# Patient Record
Sex: FEMALE | Race: OTHER | NOT HISPANIC OR LATINO | URBAN - METROPOLITAN AREA
[De-identification: names, ages, dates, MRNs, and addresses within clinical notes are randomized per-mention and may not be internally consistent; named-entity substitution may affect disease eponyms.]

---

## 2022-12-28 ENCOUNTER — HOSPITAL ENCOUNTER (OUTPATIENT)
Dept: PREADMISSION TESTING | Facility: OTHER | Age: 72
Discharge: HOME | End: 2022-12-28
Payer: COMMERCIAL

## 2023-02-03 DIAGNOSIS — I89.0 LYMPHEDEMA: Primary | ICD-10-CM

## 2023-02-28 ENCOUNTER — CLINICAL SUPPORT (OUTPATIENT)
Dept: REHABILITATION | Facility: HOSPITAL | Age: 73
End: 2023-02-28
Payer: MEDICARE

## 2023-02-28 DIAGNOSIS — I89.0 LYMPHEDEMA OF RIGHT ARM: ICD-10-CM

## 2023-02-28 PROCEDURE — 97530 THERAPEUTIC ACTIVITIES: CPT

## 2023-02-28 PROCEDURE — 97166 OT EVAL MOD COMPLEX 45 MIN: CPT

## 2023-02-28 NOTE — PLAN OF CARE
[facility]  Occupational Therapy Initial Evaluation    Date: 2/28/2023  Name: Tiara Rasheed  Clinic Number: [id number]    Therapy Diagnosis:   Encounter Diagnosis   Name Primary?    Lymphedema of right arm      Physician: Shiloh Tao MD    Physician Orders: OT Eval and Treat  Medical Diagnosis: Lymphedema of RUE s/p breast cancer  Evaluation Date: 2/28/2023  Insurance Authorization Period Expiration: 12/31/2023  Plan of Care Certification Period: 2/28/2023 - 4/24/2023  Date of Return to MD: unknown  Visit # / Visits authorized: 1 / 1  FOTO: see media    Precautions:  Standard and cancer    Time In:3:00  Time Out: 4:00  Total Appointment Time (timed & untimed codes): 60 minutes    SUBJECTIVE     Date of Onset: over 15 years ago  Prior Therapy: several sessions of therapy over the years for RUE    History of Current Condition/Mechanism of Injury: Tiara Rasheed is a 72 y.o. female who presents to [facility] Outpatient Occupational Therapy for evaluation secondary to lymphedema. Patient was referred to therapy by Shiloh Tao MD , which is the patient's oncologist. Patient reports R Breast cancer about 15 years ago.  Patient underwent R mastectomy, axillary node dissection, chemotherapy and radiation. Patient reports lymphedema in RUE began about 3 years later. Patient has had therapy off and on for 10+years including manual lymphatic drainage and short stretch compression bandaging but has not had any formal treatment since covid 2020.  Patient has had history of cellulitis In the RUE.  She reports calling her MD on 2/23/2023, sending a photo, and being put on antibiotic for the pink color and increased swelling in the RUE and was put on antibiotocs that began 2/23/23 and will continue for 5 more days.   Patient has neuropathy  and spinal stenosis and a spur in the L shoulder causing neck pain   Patient was accompanied to the evaluation by Leif, her .      Falls: at risk, none recent    Occupation/Working presently: retired  Duties: homemaker    Functional Limitations/Social History:    Previous functional status includes: Independent with all ADLs.     Current Functional Status   Home/Living environment: lives with their spouse      Limitation of Functional Status as follows:   ADLs/IADLs:     - Feeding: independent    - Bathing: independent    - Dressing/Grooming: independent    - Driving: able but chooses to let her spouse drive her at this time.     Leisure: nor performing due to pain    Pain:  Functional Pain Scale Rating 0-10: Current 7/10, worst 8/10, best 7/10   Location: R arm/back/legs  Description: heavyheavy  Aggravating Factors: all the time  Easing Factors:  unable to tolerate walking and unable to take Advil which was helping    Patient's Goals for Therapy: to get the R arm back under control    Pt denies , KF, DVT, CA, infection, severe PAD  Recent Ultrasound test conformed no blood clots in RUE.        Medical History:   No past medical history on file. Patient reports Breast Cancer, Neuropathy, Spinal Stenosis, Congestive Heart Failure, spur in L shoulder    Surgical History:    has no past surgical history on file. .    Medications:   currently has no medications in their medication list.    Allergies:   Review of patient's allergies indicates:  Not on File       OBJECTIVE     Patient arrived and ambulates to the back of the clinic with some difficulty    Affected Areas: RUE  Refill: Day   Shape: very large          Tissue Texture: soft, pliable, pitting and spongy  Skin Integrity: intact, pink in color, reportedly increased last week. See photo above  Sensation: intact    UE Girth Measurements:  LANDMARK RIGHT UE  2/28/23 LEFT UE  2/28/23 DIFF   at eval         E + 15cm 36.0 cm 34.5 cm -1.5 cm   E + 10cm 39.0 cm 34.5 cm -4.5 cm   Elbow 35.5 cm 29.5 cm -6.0 cm         W + 15cm 36.0 cm 26.0 cm -10.0 cm   W + 10cm 31.0 cm 22.5 cm -9.5 cm    Wrist 20.0 cm 17.0 cm -3.0 cm   DPC 19.5 cm 18.0 cm -1.5 cm   IP 6.0 cm 5.5 cm -.5cm cm       Treatment   Total Treatment time (time-based codes) separate from Evaluation: 12 minutes    Tiara received the treatments listed below:     Pt was educated in potential compression needs.  Discussed cost/coverage and authorization per insurance with Durable Medical Equipment(DME) provider.  Compression require orders from referring provider and coverage or purchase of products from DME or self order.      Discussed wear schedule, don/doff, wash and management of products.  Size and compression class and AM/PM needs.    Product information provided.   Vendor list provided.    Informed insurance coverage of compression is per DME provider and typically Medicare and Medicare group plans may not cover cost beyond pair of standard sized knee high garments.   Commitment to attendance as well as commitment to securing compression needs is critical to edema management.        Patient Education and Home Exercises      Education provided:   1. Educated on definition of lymphedema.  2. Explained the Complete Decongestive Therapy protocol in depth  3. Educated on Phase 1 and 2 of protocol.  4. Reviewed treatment frequency and likely duration of weeks  5.Contraindications for treatment.  6. Plan of care and goals.  7. Educated on home management protocols.     Home Exercises and Patient Education Provided  Education provided:   - Pt was educated in lymphedema etiology and management plans.  Pt was provided with written risk reductions and precautions for managing lymphedema.     Patient/Family Education: role of OT, goals for OT, scheduling/cancellations - pt verbalized understanding. Discussed insurance limitations with patient.      ASSESSMENT     Tiara Rasheed is a 72 y.o. female referred to outpatient occupational therapy and presents with a medical diagnosis of lymphedema secondary to breast cancer. Lymphedema, left  untreated increases risk of infection, and poor body image. Patient presents with the following therapy deficits: RUE lymphedema and demonstrates limitations as described in the chart below. Following medical record review it is determined that pt will benefit from complete decongestive therapy services for the treatment and management of this chronic condition. The following goals were discussed with the patient and patient is in agreement with them as to be addressed in the treatment plan. The patient's rehab potential is Good.     Anticipated barriers to occupational therapy: recurrent cellulitis  Pt has no cultural, educational or language barriers to learning provided.    Profile and History Assessment of Occupational Performance Level of Clinical Decision Making Complexity Score   Occupational Profile:   Tiara Rasheed is a 72 y.o. female who lives with their spouse and is retired Tiara Rasheed has difficulty with  ADLs and IADLs as listed previously, which  Affecting herdaily functional abilities.      Comorbidities:    has no past medical history on file.    Medical and Therapy History Review:   Expanded               Performance Deficits    Physical:  Muscle Power/Strength  Muscle Endurance  Edema  Pain    Cognitive:  No Deficits    Psychosocial:    No Deficits     Clinical Decision Making:  moderate    Assessment Process:  Detailed Assessments    Modification/Need for Assistance:  Minimal-Moderate Modifications/Assistance    Intervention Selection:  Several Treatment Options       moderate  Based on PMHX, co morbidities , data from assessments and functional level of assistance required with task and clinical presentation directly impacting function.       The following goals were discussed with the patient and patient is in agreement with them as to be addressed in the treatment plan.     Goals:   Short Term Goals for 3 weeks: (phase 1 of protocol)  1. Patient and/or caregiver will demonstrate  understanding of lymphedema precautions to decrease the risk of infection and lymphedema exacerbation. - Ongoing 2/28/2023   2. Patient will tolerate daily activities wearing multilayered bandaging.- Ongoing 2/28/2023   3. Patient and/or caregiver will order/obtain appropriate compression garments- Ongoing 2/28/2023  4. Patient will experience a decrease in girth of affected areas of 1.0 cm- Ongoing 2/28/2023      Long Term Goals for 6 weeks: (Phase 2 of goals)  1. Patient and/or caregiver will be independent with donning and doffing of compression garments.  2. Patient and/or caregiver will be independent in self-bandaging and self MLD techniques.  3. Patient and/or caregiver will be independent with HEP and lymphedema management to help prevent edema relapse and reduce risk of infection.  4. Patient will experience a decreased in girth of affected areas of 2.0 cm      PLAN   Plan of Care Certification: 2/28/2023 to 4/24/2023.     Outpatient Occupational Therapy 2 times weekly for 8 weeks to include the following interventions: Edema Control.      SOFIA Jacques      I CERTIFY THE NEED FOR THESE SERVICES FURNISHED UNDER THIS PLAN OF TREATMENT AND WHILE UNDER MY CARE  Physician's comments:      Physician's Signature: ___________________________________________________

## 2023-03-07 ENCOUNTER — CLINICAL SUPPORT (OUTPATIENT)
Dept: REHABILITATION | Facility: HOSPITAL | Age: 73
End: 2023-03-07
Payer: MEDICARE

## 2023-03-07 DIAGNOSIS — I89.0 LYMPHEDEMA OF RIGHT ARM: Primary | ICD-10-CM

## 2023-03-07 PROCEDURE — 97140 MANUAL THERAPY 1/> REGIONS: CPT

## 2023-03-07 NOTE — PROGRESS NOTES
[facility]  Occupational Therapy Treatment Note    Date: 3/7/2023  Name: Tiara Rasheed  Clinic Number: [id number]    Time In: 11:00  Time Out: 12:00  Total Billable Time: 60 minutes    Therapy Diagnosis:   Encounter Diagnosis   Name Primary?    Lymphedema of right arm Yes     Physician: Shiloh Tao MD  Physician Orders: OT Eval and Treat    Evaluation Date: 2/28/2023  Insurance Authorization Period Expiration: 2/28/2024  Plan of Care Expiration: 4/24/2023  Visit # / Visits authorized: 1 / 20    Precautions:  Standard and cancer    SUBJECTIVE     Pt reports: She has contacted her MD office many times about the remaining pinkness in the RUE, with no return phone call. Patient has now completed 10 days of antibiotics.     Response to previous treatment:first follow up visit today  Functional change: n/a    Pain: 4/10  Location: right arm    OBJECTIVE     Pt arrived wearing nothing on the arm. The arm is pink but not warm, see photo.            Objective Measures updated at progress report unless specified.    Treatment     Tiara received the treatments listed below:     Manual therapy techniques were applied for 60 minutes, including:    MANUAL LYMPHATIC DRAINAGE (MLD):    While supine with LEs elevated  over wedge, stimulation performed about the lymph nodes of the head and neck.  The entire R upper quadrant received drainage. The anterior chest wall was shunted across towards the L axilla. The  arm is full of thick pitting edema. There is pinkness but not warmth to the skin, see photos. The upper arm was treated with scooping and pumping and deep thumb [place] techniques. The cubital fossa was cleared. The forearm was treated with deep thumb [place] techniques to break down the pitting edema, as was the dorsal hand. The digits were cleared then the entire arm was shunted up through the posterior upper arm. Patient turned to sidelying and fluid was shunted across the back towards  the L axilla.     MULTILAYERED BANDAGING:  issued supplies and bandaged R UE with cotton stockinette, 1-6cm, 1-8cm and - 10cm Ros[place] K rolls dorsal hand to axilla, to leave intact 12-24 hrs as tolerated, discontinue with any problems, return rolled bandages next session. Wash and wear schedules confirmed.     Pt was educated in potential compression needs.       Discussed wear schedule, don/doff, wash and management of products.    Patient Education and Home Exercises      Education provided:   1. Educated on cause of lymphedema.  2. Explained the Complete Decongestive Therapy protocol in depth  3. Educated on Phase 1 and 2 of protocol.  4. Reviewed treatment frequency and likely duration of weeks  5. Precautions and contraindications for treatment.  6. Plan of care and goals.  7. Educated on home management protocols.     Written Home Compression program Provided: Patient to have the arm in short stretch compression bandages or compression sleeve daily.  E       Assessment        Tiara Rasheed is a 72 y.o. female referred to outpatient occupational therapy and presents with a medical diagnosis of lymphedema secondary to breast cancer. Lymphedema, left untreated increases risk of infection, and poor body image. Patient presents with the following therapy deficits: RUE lymphedema .Following medical record review it is determined that pt will benefit from complete decongestive therapy services for the treatment and management of this chronic condition. The following goals were discussed with the patient and patient is in agreement with them as to be addressed in the treatment plan. The patient's rehab potential is Good.   Pt would continue to benefit from skilled OT. yes     Tiara is progressing well towards her goals and there are no updates to goals at this time. Pt prognosis is Good.     Pt will continue to benefit from skilled outpatient occupational therapy to address the deficits listed in the problem  list on initial evaluation provide pt/family education and to maximize pt's level of independence in the home and community environment.     Pt's spiritual, cultural and educational needs considered and pt agreeable to plan of care and goals.    Anticipated barriers to occupational therapy: duration of lymphedema and size of the arm    Goals:   Short Term Goals for 3 weeks: (phase 1 of protocol)  1. Patient and/or caregiver will demonstrate understanding of lymphedema precautions to decrease the risk of infection and lymphedema exacerbation. - Ongoing 3/7/2023   2. Patient will tolerate daily activities wearing multilayered bandaging.- Ongoing 3/7/2023   3. Patient and/or caregiver will order/obtain appropriate compression garments- Ongoing [id number]  4. Patient will experience a decrease in girth of affected areas of 1.0 cm- Ongoing3/7/2023      Long Term Goals for 6 weeks: (Phase 2 of goals)  1. Patient and/or caregiver will be independent with donning and doffing of compression garments.  2. Patient and/or caregiver will be independent in self-bandaging and self MLD techniques.  3. Patient and/or caregiver will be independent with HEP and lymphedema management to help prevent edema relapse and reduce risk of infection.  4. Patient will experience a decreased in girth of affected areas of 2.0 cm     PLAN     Continue plan of care 2 times weekly for 8 weeks to include the following interventions: Edema Control.    Ute Yeung OT, SOFIA

## 2023-03-14 ENCOUNTER — CLINICAL SUPPORT (OUTPATIENT)
Dept: REHABILITATION | Facility: HOSPITAL | Age: 73
End: 2023-03-14
Payer: MEDICARE

## 2023-03-14 DIAGNOSIS — I89.0 LYMPHEDEMA OF RIGHT ARM: Primary | ICD-10-CM

## 2023-03-14 PROCEDURE — 97140 MANUAL THERAPY 1/> REGIONS: CPT

## 2023-03-14 NOTE — PROGRESS NOTES
[facility]  Occupational Therapy Treatment Note    Date: 3/14/2023  Name: Tiara Rasheed  Clinic Number: [id number]    Time In: 11:00  Time Out: 12:00  Total Billable Time: 60 minutes    Therapy Diagnosis:   Encounter Diagnosis   Name Primary?    Lymphedema of right arm Yes     Physician: Shiloh Tao MD  Physician Orders: OT Eval and Treat    Evaluation Date: 2/28/2023  Insurance Authorization Period Expiration: 2/28/2024  Plan of Care Expiration: 4/24/2023  Visit # / Visits authorized: 2 / 20    Precautions:  Standard and cancer    SUBJECTIVE     Pt reports: She contacted her MD office  again about the remaining pinkness in the RUE, with no return phone call. Patient has now completed 10 days of antibiotics.     Response to previous treatment: the RUE is softer throughout. Following MLD the pinkness in the arm is lighter  Functional change: n/a    Pain: 4/10  Location: right arm    OBJECTIVE     Pt arrived wearing nothing on the arm. The arm remains pink but not warm.    Objective Measures updated at progress report unless specified.    Treatment     Tiara received the treatments listed below:     Manual therapy techniques were applied for 60 minutes, including:    MANUAL LYMPHATIC DRAINAGE (MLD):    While supine with LEs elevated  over wedge, stimulation performed about the lymph nodes of the head and neck.  The entire R upper quadrant received drainage. The anterior chest wall was shunted across towards the L axilla. The  arm is full of thick pitting edema. There is pinkness but not warmth to the skin. The upper arm was treated with scooping and pumping and deep thumb [place] techniques. The cubital fossa was cleared. The forearm was treated with deep thumb [place] techniques to break down the pitting edema, as was the dorsal hand. The digits were cleared then the entire arm was shunted up through the posterior upper arm. Patient turned to sidelying and fluid was shunted across  the back towards the L axilla.     MULTILAYERED BANDAGING: bandaged R UE with cotton stockinette, 1-6cm, 1-8cm and - 10cm [place] K rolls dorsal hand to axilla, to leave intact 12-24 hrs as tolerated, discontinue with any problems, return rolled bandages next session. Wash and wear schedules confirmed.     Pt was educated in potential compression needs.       Discussed wear schedule, don/doff, wash and management of products.    Patient Education and Home Exercises      Education provided:   1. Educated on cause of lymphedema.  2. Explained the Complete Decongestive Therapy protocol in depth  3. Educated on Phase 1 and 2 of protocol.  4. Reviewed treatment frequency and likely duration of weeks  5. Precautions and contraindications for treatment.  6. Plan of care and goals.  7. Educated on home management protocols.     Written Home Compression program Provided: Patient to have the arm in short stretch compression bandages or compression sleeve daily.  E       Assessment        Tiara Rasheed is a 72 y.o. female referred to outpatient occupational therapy and presents with a medical diagnosis of lymphedema secondary to breast cancer. Lymphedema, left untreated increases risk of infection, and poor body image. Patient presents with the following therapy deficits: RUE lymphedema .Following medical record review it is determined that pt will benefit from complete decongestive therapy services for the treatment and management of this chronic condition. The following goals were discussed with the patient and patient is in agreement with them as to be addressed in the treatment plan. The patient's rehab potential is Good.   Pt would continue to benefit from skilled OT. yes     Tiara is progressing well towards her goals and there are no updates to goals at this time. Pt prognosis is Good.     Pt will continue to benefit from skilled outpatient occupational therapy to address the deficits listed in the problem list on  initial evaluation provide pt/family education and to maximize pt's level of independence in the home and community environment.     Pt's spiritual, cultural and educational needs considered and pt agreeable to plan of care and goals.    Anticipated barriers to occupational therapy: duration of lymphedema and size of the arm    Goals:   Short Term Goals for 3 weeks: (phase 1 of protocol)  1. Patient and/or caregiver will demonstrate understanding of lymphedema precautions to decrease the risk of infection and lymphedema exacerbation. - Ongoing 3/14/2023   2. Patient will tolerate daily activities wearing multilayered bandaging.- Ongoing 3/14/2023   3. Patient and/or caregiver will order/obtain appropriate compression garments- Ongoing 3/14/2023  4. Patient will experience a decrease in girth of affected areas of 1.0 cm- Ongoing3/14/2023      Long Term Goals for 6 weeks: (Phase 2 of goals)  1. Patient and/or caregiver will be independent with donning and doffing of compression garments.  2. Patient and/or caregiver will be independent in self-bandaging and self MLD techniques.  3. Patient and/or caregiver will be independent with HEP and lymphedema management to help prevent edema relapse and reduce risk of infection.  4. Patient will experience a decreased in girth of affected areas of 2.0 cm     PLAN     Continue plan of care 2 times weekly for 8 weeks to include the following interventions: Edema Control.    Ute Yeung OT, SOFIA

## 2023-03-16 ENCOUNTER — CLINICAL SUPPORT (OUTPATIENT)
Dept: REHABILITATION | Facility: HOSPITAL | Age: 73
End: 2023-03-16
Payer: MEDICARE

## 2023-03-16 DIAGNOSIS — I89.0 LYMPHEDEMA OF RIGHT ARM: Primary | ICD-10-CM

## 2023-03-16 PROCEDURE — 97140 MANUAL THERAPY 1/> REGIONS: CPT

## 2023-03-16 NOTE — PROGRESS NOTES
[facility]  Occupational Therapy Treatment Note    Date: 3/16/2023  Name: Tiara Rasheed  Clinic Number: [id number]    Time In: 1:00  Time Out: 2:00  Total Billable Time: 60 minutes    Therapy Diagnosis:   Encounter Diagnosis   Name Primary?    Lymphedema of right arm Yes     Physician: Shiloh Tao MD  Physician Orders: OT Eval and Treat    Evaluation Date: 2/28/2023  Insurance Authorization Period Expiration: 2/28/2024  Plan of Care Expiration: 4/24/2023  Visit # / Visits authorized:3/ 20    Precautions:  Standard and cancer    SUBJECTIVE     Pt reports: The RUE is less pink over the last 2 days. Patient states she is wearing the bandages or the compression sleeve daily.     Response to previous treatment: the RUE is softer throughout. Following MLD the pinkness in the arm is lighter  Functional change: n/a    Pain: 8/10  Location: the back    OBJECTIVE     Pt arrived wearing nothing on the arm. The arm remains pink but not warm.    Objective Measures updated at progress report unless specified.    Treatment     Tiara received the treatments listed below:     Manual therapy techniques were applied for 60 minutes, including:    MANUAL LYMPHATIC DRAINAGE (MLD):    While supine with LEs elevated  over wedge, stimulation performed about the lymph nodes of the head and neck.  The entire R upper quadrant received drainage. The anterior chest wall was shunted across towards the L axilla. The  arm is full of thick pitting edema. There is decreased pinkness but not warmth to the skin. The upper arm was treated with scooping and pumping and deep thumb [place] techniques. The cubital fossa was cleared. The forearm was treated with deep thumb [place] techniques to break down the pitting edema, as was the dorsal hand. The digits were cleared then the entire arm was shunted up through the posterior upper arm. Patient turned to sidelying and fluid was shunted across the back towards the L  axilla.     MULTILAYERED BANDAGING: bandaged R UE with cotton stockinette, 1-6cm, 1-8cm and - 10cm Rosidal K rolls dorsal hand to axilla, to leave intact 12-24 hrs as tolerated, discontinue with any problems, return rolled bandages next session. Wash and wear schedules confirmed.     Pt was educated in potential compression needs.       Discussed wear schedule, don/doff, wash and management of products.    Patient Education and Home Exercises      Education provided:   1. Educated on cause of lymphedema.  2. Explained the Complete Decongestive Therapy protocol in depth  3. Educated on Phase 1 and 2 of protocol.  4. Reviewed treatment frequency and likely duration of weeks  5. Precautions and contraindications for treatment.  6. Plan of care and goals.  7. Educated on home management protocols.     Written Home Compression program Provided: Patient to have the arm in short stretch compression bandages or compression sleeve daily.  E       Assessment        Tiara Rasheed is a 72 y.o. female referred to outpatient occupational therapy and presents with a medical diagnosis of lymphedema secondary to breast cancer. Lymphedema, left untreated increases risk of infection, and poor body image. Patient presents with the following therapy deficits: RUE lymphedema .Following medical record review it is determined that pt will benefit from complete decongestive therapy services for the treatment and management of this chronic condition. The following goals were discussed with the patient and patient is in agreement with them as to be addressed in the treatment plan. The patient's rehab potential is Good.   Pt would continue to benefit from skilled OT. yes     Tiara is progressing well towards her goals and there are no updates to goals at this time. Pt prognosis is Good.     Pt will continue to benefit from skilled outpatient occupational therapy to address the deficits listed in the problem list on initial evaluation  provide pt/family education and to maximize pt's level of independence in the home and community environment.     Pt's spiritual, cultural and educational needs considered and pt agreeable to plan of care and goals.    Anticipated barriers to occupational therapy: duration of lymphedema and size of the arm    Goals:   Short Term Goals for 3 weeks: (phase 1 of protocol)  1. Patient and/or caregiver will demonstrate understanding of lymphedema precautions to decrease the risk of infection and lymphedema exacerbation. - Ongoing 3/16/2023   2. Patient will tolerate daily activities wearing multilayered bandaging.- Ongoing 3/16/2023   3. Patient and/or caregiver will order/obtain appropriate compression garments- Ongoing [id number]  4. Patient will experience a decrease in girth of affected areas of 1.0 cm- Ongoing3/16/2023      Long Term Goals for 6 weeks: (Phase 2 of goals)  1. Patient and/or caregiver will be independent with donning and doffing of compression garments.  2. Patient and/or caregiver will be independent in self-bandaging and self MLD techniques.  3. Patient and/or caregiver will be independent with HEP and lymphedema management to help prevent edema relapse and reduce risk of infection.  4. Patient will experience a decreased in girth of affected areas of 2.0 cm     PLAN     Continue plan of care 2 times weekly for 8 weeks to include the following interventions: Edema Control.    Ute Yeung OT, SOFIA

## 2023-03-23 ENCOUNTER — CLINICAL SUPPORT (OUTPATIENT)
Dept: REHABILITATION | Facility: HOSPITAL | Age: 73
End: 2023-03-23
Payer: COMMERCIAL

## 2023-03-23 DIAGNOSIS — I89.0 LYMPHEDEMA OF RIGHT ARM: Primary | ICD-10-CM

## 2023-03-23 PROCEDURE — 97140 MANUAL THERAPY 1/> REGIONS: CPT

## 2023-03-23 NOTE — PROGRESS NOTES
[facility]  Occupational Therapy Treatment Note    Date: 3/23/2023  Name: Tiara Rasheed  Clinic Number: [id number]    Time In: 11:00  Time Out: 12:00  Total Billable Time: 60 minutes    Therapy Diagnosis:   Encounter Diagnosis   Name Primary?    Lymphedema of right arm Yes     Physician: Shiloh Tao MD  Physician Orders: OT Eval and Treat    Evaluation Date: 2/28/2023  Insurance Authorization Period Expiration: 2/28/2024  Plan of Care Expiration: 4/24/2023  Visit # / Visits authorized:4/ 20    Precautions:  Standard and cancer    SUBJECTIVE     Pt reports: The RUE continues to be pink over the last week. Patient is concerned about the color and is unable to have oral procedures until the pink resolves. Patient is using the compression pump at home daily.     Response to previous treatment: the RUE is softer throughout. Immediately following MLD the pinkness in the arm is lighter  Functional change: n/a    Pain: 8/10  Location: the back, neck, legs    OBJECTIVE     Pt arrived wearing nothing on the arm. The arm remains pink but not warm.            UE Girth Measurements:  LANDMARK RIGHT UE  2/28/23 Right UE  3/23/23 DIFF               E + 15cm 36.0 cm 35.0 cm -1.0cm   E + 10cm 39.0 cm 36.5 cm -2.5 cm   Elbow 35.5 cm 34.0 cm -1.5cm             W + 15cm 36.0 cm 34.0 cm -2.0 cm   W + 10cm 31.0 cm 31.0 cm 0 cm   Wrist 20.0 cm 19.5 cm -.5cm   DPC 19.5 cm 19.5cm 0 cm   IP 6.0 cm 6.0 cm 0 cm      Objective Measures updated at progress report unless specified.    Treatment     Tiara received the treatments listed below:     Manual therapy techniques were applied for 60 minutes, including:    MANUAL LYMPHATIC DRAINAGE (MLD):    While supine with LEs elevated  over wedge, stimulation performed about the lymph nodes of the head and neck.  The entire R upper quadrant received drainage. The anterior chest wall was shunted across towards the L axilla. The  arm is full of thick pitting edema.  There is persistent  pinkness but not warmth to the skin. The upper arm was treated with scooping and pumping and deep thumb [place] techniques. The cubital fossa was cleared. The forearm was treated with deep thumb [place] techniques to break down the pitting edema, as was the dorsal hand. The digits were cleared then the entire arm was shunted up through the posterior upper arm. Patient turned to sidelying and fluid was shunted across the back towards the L axilla.     MULTILAYERED BANDAGING: bandaged R UE with cotton stockinette, 1-6cm, 1-8cm and - 10cm Ros[place] K rolls dorsal hand to axilla, to leave intact 12-24 hrs as tolerated, discontinue with any problems, return rolled bandages next session. Wash and wear schedules confirmed.     Patient was encouraged to continue using her home sequential compression pump daily    Pt was educated in potential compression needs.       Discussed wear schedule, don/doff, wash and management of products.    Patient Education and Home Exercises      Education provided:   1. Educated on cause of lymphedema.  2. Explained the Complete Decongestive Therapy protocol in depth  3. Educated on Phase 1 and 2 of protocol.  4. Reviewed treatment frequency and likely duration of weeks  5. Precautions and contraindications for treatment.  6. Plan of care and goals.  7. Educated on home management protocols.     Written Home Compression program Provided: Patient to have the arm in short stretch compression bandages or compression sleeve daily.         Assessment        Tiara Rasheed is a 72 y.o. female referred to outpatient occupational therapy and presents with a medical diagnosis of lymphedema secondary to breast cancer. Lymphedema, left untreated increases risk of infection, and poor body image. Patient presents with the following therapy deficits: RUE lymphedema .Following medical record review it is determined that pt will benefit from complete decongestive therapy services for the  treatment and management of this chronic condition. The following goals were discussed with the patient and patient is in agreement with them as to be addressed in the treatment plan. The patient's rehab potential is Good.   Pt would continue to benefit from skilled OT. yes     Tiara is progressing well towards her goals and there are no updates to goals at this time. Pt prognosis is Good.     Pt will continue to benefit from skilled outpatient occupational therapy to address the deficits listed in the problem list on initial evaluation provide pt/family education and to maximize pt's level of independence in the home and community environment.     Pt's spiritual, cultural and educational needs considered and pt agreeable to plan of care and goals.    Anticipated barriers to occupational therapy: duration of lymphedema and size of the arm    Goals:   Short Term Goals for 3 weeks: (phase 1 of protocol)  1. Patient and/or caregiver will demonstrate understanding of lymphedema precautions to decrease the risk of infection and lymphedema exacerbation. - Ongoing 3/23/2023   2. Patient will tolerate daily activities wearing multilayered bandaging.- Ongoing [id number]   3. Patient and/or caregiver will order/obtain appropriate compression garments- Ongoing 3/23/2023  4. Patient will experience a decrease in girth of affected areas of 1.0 cm- Partially met 3/23/2023      Long Term Goals for 6 weeks: (Phase 2 of goals)  1. Patient and/or caregiver will be independent with donning and doffing of compression garments.  2. Patient and/or caregiver will be independent in self-bandaging and self MLD techniques.  3. Patient and/or caregiver will be independent with HEP and lymphedema management to help prevent edema relapse and reduce risk of infection.  4. Patient will experience a decreased in girth of affected areas of 2.0 cm     PLAN     Continue plan of care 2 times weekly for 8 weeks to include the following  interventions: Edema Control.    Ute Yeung OT, SOFIA

## 2023-03-30 ENCOUNTER — CLINICAL SUPPORT (OUTPATIENT)
Dept: REHABILITATION | Facility: HOSPITAL | Age: 73
End: 2023-03-30
Payer: MEDICARE

## 2023-03-30 DIAGNOSIS — I89.0 LYMPHEDEMA OF RIGHT ARM: Primary | ICD-10-CM

## 2023-03-30 PROCEDURE — 97140 MANUAL THERAPY 1/> REGIONS: CPT

## 2023-03-30 NOTE — PROGRESS NOTES
[facility]  Occupational Therapy Treatment Note    Date: 3/30/2023  Name: Tiara Rasheed  Clinic Number: [id number]    Time In: 1:00  Time Out: 2:00  Total Billable Time: 60 minutes    Therapy Diagnosis:   Encounter Diagnosis   Name Primary?    Lymphedema of right arm Yes     Physician: Shiloh Tao MD  Physician Orders: OT Eval and Treat    Evaluation Date: 2/28/2023  Insurance Authorization Period Expiration: 2/28/2024  Plan of Care Expiration: 4/24/2023  Visit # / Visits authorized:5/ 20    Precautions:  Standard and cancer    SUBJECTIVE     Pt reports: The RUE continues to be pink over the last week. Patient did have a call from her MD's nurse but was unable to return the call.  Patient is using the compression pump at home daily.     Response to previous treatment: the RUE is softer throughout. Immediately following MLD the pinkness in the arm is lighter  Functional change: n/a    Pain: 8/10  Location: the back, neck, legs    OBJECTIVE     Pt arrived wearing nothing on the arm. The arm remains pink but not warm.          Objective Measures updated at progress report unless specified.    Treatment     Tiara received the treatments listed below:     Manual therapy techniques were applied for 60 minutes, including:    MANUAL LYMPHATIC DRAINAGE (MLD):    While supine with LEs elevated  over wedge, stimulation performed about the lymph nodes of the head and neck.  The entire R upper quadrant received drainage. The anterior chest wall was shunted across towards the L axilla. The  arm is full of thick pitting edema. There is persistent  pinkness but not warmth to the skin. The upper arm was treated with scooping and pumping and deep thumb [place] techniques. The cubital fossa was cleared. The forearm was treated with deep thumb [place] techniques to break down the pitting edema, as was the dorsal hand. The digits were cleared then the entire arm was shunted up through the posterior  upper arm. Patient turned to sidelying and fluid was shunted across the back towards the L axilla.     MULTILAYERED BANDAGING: bandaged R UE with cotton stockinette, 1-6cm, 1-8cm and - 10cm Ros[place] K rolls dorsal hand to axilla, to leave intact 12-24 hrs as tolerated, discontinue with any problems, return rolled bandages next session. Wash and wear schedules confirmed.     Patient was encouraged to continue using her home sequential compression pump daily    Pt was educated in potential compression needs.       Discussed wear schedule, don/doff, wash and management of products.    Patient Education and Home Exercises      Education provided:   1. Educated on cause of lymphedema.  2. Explained the Complete Decongestive Therapy protocol in depth  3. Educated on Phase 1 and 2 of protocol.  4. Reviewed treatment frequency and likely duration of weeks  5. Precautions and contraindications for treatment.  6. Plan of care and goals.  7. Educated on home management protocols.     Written Home Compression program Provided: Patient to have the arm in short stretch compression bandages or compression sleeve daily.         Assessment        Tiara Rasheed is a 72 y.o. female referred to outpatient occupational therapy and presents with a medical diagnosis of lymphedema secondary to breast cancer. Lymphedema, left untreated increases risk of infection, and poor body image. Patient presents with the following therapy deficits: RUE lymphedema .Following medical record review it is determined that pt will benefit from complete decongestive therapy services for the treatment and management of this chronic condition. The following goals were discussed with the patient and patient is in agreement with them as to be addressed in the treatment plan. The patient's rehab potential is Good.   Pt would continue to benefit from skilled OT. yes     Tiara is progressing well towards her goals and there are no updates to goals at this  time. Pt prognosis is Good.     Pt will continue to benefit from skilled outpatient occupational therapy to address the deficits listed in the problem list on initial evaluation provide pt/family education and to maximize pt's level of independence in the home and community environment.     Pt's spiritual, cultural and educational needs considered and pt agreeable to plan of care and goals.    Anticipated barriers to occupational therapy: duration of lymphedema and size of the arm    Goals:   Short Term Goals for 3 weeks: (phase 1 of protocol)  1. Patient and/or caregiver will demonstrate understanding of lymphedema precautions to decrease the risk of infection and lymphedema exacerbation. - Ongoing 3/30/2023 0/  2. Patient will tolerate daily activities wearing multilayered bandaging.- Ongoing 3/30/2023   3. Patient and/or caregiver will order/obtain appropriate compression garments- Ongoing3/30/2023  4. Patient will experience a decrease in girth of affected areas of 1.0 cm- Partially met 3/23/2023      Long Term Goals for 6 weeks: (Phase 2 of goals)  1. Patient and/or caregiver will be independent with donning and doffing of compression garments.  2. Patient and/or caregiver will be independent in self-bandaging and self MLD techniques.  3. Patient and/or caregiver will be independent with HEP and lymphedema management to help prevent edema relapse and reduce risk of infection.  4. Patient will experience a decreased in girth of affected areas of 2.0 cm     PLAN     Continue plan of care 2 times weekly for 8 weeks to include the following interventions: Edema Control.    Ute Yeung OT, SOFIA

## 2023-04-04 ENCOUNTER — CLINICAL SUPPORT (OUTPATIENT)
Dept: REHABILITATION | Facility: HOSPITAL | Age: 73
End: 2023-04-04
Payer: COMMERCIAL

## 2023-04-04 DIAGNOSIS — I89.0 LYMPHEDEMA OF RIGHT ARM: Primary | ICD-10-CM

## 2023-04-04 PROCEDURE — 97140 MANUAL THERAPY 1/> REGIONS: CPT

## 2023-04-04 NOTE — PROGRESS NOTES
[facility]  Occupational Therapy Treatment Note    Date: 4/4/2023  Name: Tiara Rasheed  Clinic Number: [id number]    Time In: 11:00  Time Out: 12:00  Total Billable Time: 60 minutes    Therapy Diagnosis:   Encounter Diagnosis   Name Primary?    Lymphedema of right arm Yes     Physician: Shiloh Tao MD  Physician Orders: OT Eval and Treat    Evaluation Date: 2/28/2023  Insurance Authorization Period Expiration: 2/28/2024  Plan of Care Expiration: 4/24/2023  Visit # / Visits authorized:6/ 20    Precautions:  Standard and cancer    SUBJECTIVE     Pt reports: The RUE continues to be pink over the last week.  Patient is using the compression pump at home daily, she used it this morning  Patient is having severe back pain.     Response to previous treatment: the RUE is softer throughout. Immediately following MLD the pinkness in the arm is lighter  Functional change: n/a    Pain: 8/10  Location: the back, neck, legs    OBJECTIVE     Pt arrived wearing nothing on the arm. The arm remains pink but not warm.            Objective Measures updated at progress report unless specified.    Treatment     Tiara received the treatments listed below:     Manual therapy techniques were applied for 60 minutes, including:    MANUAL LYMPHATIC DRAINAGE (MLD):    While supine with LEs elevated  over wedge, stimulation performed about the lymph nodes of the head and neck.  The entire R upper quadrant received drainage. The anterior chest wall was shunted across towards the L axilla. The  arm is full of thick pitting edema. There is persistent  pinkness but not warmth to the skin. The upper arm was treated with scooping and pumping and deep thumb [place] techniques. The cubital fossa was cleared. The forearm was treated with deep thumb [place] techniques to break down the pitting edema, as was the dorsal hand. The digits were cleared then the entire arm was shunted up through the posterior upper arm.  Patient turned to sidelying and fluid was shunted across the back towards the L axilla.     MULTILAYERED BANDAGING: bandaged R UE with cotton stockinette, 1-6cm, 1-8cm and - 10cm Ros[place] K rolls dorsal hand to axilla, to leave intact 12-24 hrs as tolerated, discontinue with any problems, return rolled bandages next session. Wash and wear schedules confirmed.     Patient was encouraged to continue using her home sequential compression pump daily    Pt was educated in potential compression needs.       Discussed wear schedule, don/doff, wash and management of products.    Patient Education and Home Exercises      Education provided:   1. Educated on cause of lymphedema.  2. Explained the Complete Decongestive Therapy protocol in depth  3. Educated on Phase 1 and 2 of protocol.  4. Reviewed treatment frequency and likely duration of weeks  5. Precautions and contraindications for treatment.  6. Plan of care and goals.  7. Educated on home management protocols.     Written Home Compression program Provided: Patient to have the arm in short stretch compression bandages or compression sleeve daily.         Assessment        Tiara Rasheed is a 72 y.o. female referred to outpatient occupational therapy and presents with a medical diagnosis of lymphedema secondary to breast cancer. Lymphedema, left untreated increases risk of infection, and poor body image. Patient presents with the following therapy deficits: RUE lymphedema .Following medical record review it is determined that pt will benefit from complete decongestive therapy services for the treatment and management of this chronic condition. The following goals were discussed with the patient and patient is in agreement with them as to be addressed in the treatment plan. The patient's rehab potential is Good.   Pt would continue to benefit from skilled OT. yes     Tiara is progressing well towards her goals and there are no updates to goals at this time. Pt  prognosis is Good.     Pt will continue to benefit from skilled outpatient occupational therapy to address the deficits listed in the problem list on initial evaluation provide pt/family education and to maximize pt's level of independence in the home and community environment.     Pt's spiritual, cultural and educational needs considered and pt agreeable to plan of care and goals.    Anticipated barriers to occupational therapy: duration of lymphedema and size of the arm    Goals:   Short Term Goals for 3 weeks: (phase 1 of protocol)  1. Patient and/or caregiver will demonstrate understanding of lymphedema precautions to decrease the risk of infection and lymphedema exacerbation. - Ongoing 4/4/2023   2. Patient will tolerate daily activities wearing multilayered bandaging.- Ongoing 4/4/2023   3. Patient and/or caregiver will order/obtain appropriate compression garments- Ongoing4/4/2023  4. Patient will experience a decrease in girth of affected areas of 1.0 cm- Partially met 3/23/2023      Long Term Goals for 6 weeks: (Phase 2 of goals)  1. Patient and/or caregiver will be independent with donning and doffing of compression garments.  2. Patient and/or caregiver will be independent in self-bandaging and self MLD techniques.  3. Patient and/or caregiver will be independent with HEP and lymphedema management to help prevent edema relapse and reduce risk of infection.  4. Patient will experience a decreased in girth of affected areas of 2.0 cm     PLAN     Continue plan of care 2 times weekly for 8 weeks to include the following interventions: Edema Control.    Ute Yeung OT, SOFIA

## 2023-04-13 ENCOUNTER — CLINICAL SUPPORT (OUTPATIENT)
Dept: REHABILITATION | Facility: HOSPITAL | Age: 73
End: 2023-04-13
Payer: MEDICARE

## 2023-04-13 DIAGNOSIS — I89.0 LYMPHEDEMA OF RIGHT ARM: Primary | ICD-10-CM

## 2023-04-13 PROCEDURE — 97140 MANUAL THERAPY 1/> REGIONS: CPT

## 2023-04-13 NOTE — PROGRESS NOTES
[facility]  Occupational Therapy Treatment Note    Date: 4/13/2023  Name: Tiara Rasheed  Clinic Number: [id number]    Time In: 1:00  Time Out: 2:00  Total Billable Time: 60 minutes    Therapy Diagnosis:   Encounter Diagnosis   Name Primary?    Lymphedema of right arm Yes     Physician: Shiloh Tao MD  Physician Orders: OT Eval and Treat    Evaluation Date: 2/28/2023  Insurance Authorization Period Expiration: 2/28/2024  Plan of Care Expiration: 4/24/2023  Visit # / Visits authorized:7/ 20    Precautions:  Standard and cancer    SUBJECTIVE     Pt reports: The RUE continues to be pink over the last week.  Patient is using the compression pump at home daily, she did not use it this morning  Patient is having severe back pain.     Response to previous treatment: the RUE is softer throughout. Immediately following MLD the pinkness in the arm is lighter  Functional change: n/a    Pain: 8/10  Location: the back, neck, legs    OBJECTIVE     Pt arrived wearing nothing on the arm. The arm appears slightly less pink and not warm.            Objective Measures updated at progress report unless specified.    Treatment     Tiara received the treatments listed below:     Manual therapy techniques were applied for 60 minutes, including:    MANUAL LYMPHATIC DRAINAGE (MLD):    While supine with LEs elevated  over wedge, stimulation performed about the lymph nodes of the head and neck.  The entire R upper quadrant received drainage. The anterior chest wall was shunted across towards the L axilla. The  arm is full of thick pitting edema. There is persistent  pinkness but not warmth to the skin. The upper arm was treated with scooping and pumping and deep thumb [place] techniques. The cubital fossa was cleared. The forearm was treated with deep thumb [place] techniques to break down the pitting edema, as was the dorsal hand. The digits were cleared then the entire arm was shunted up through the  posterior upper arm. Patient turned to sidelying and fluid was shunted across the back towards the L axilla.     MULTILAYERED BANDAGING: bandaged R UE with cotton stockinette, 1-6cm, 1-8cm and - 10cm Ros[place] K rolls dorsal hand to axilla, to leave intact 12-24 hrs as tolerated, discontinue with any problems, return rolled bandages next session. Wash and wear schedules confirmed.     Patient was encouraged to continue using her home sequential compression pump daily    Pt was educated in potential compression needs.       Discussed wear schedule, don/doff, wash and management of products.    Patient Education and Home Exercises      Education provided:   1. Educated on cause of lymphedema.  2. Explained the Complete Decongestive Therapy protocol in depth  3. Educated on Phase 1 and 2 of protocol.  4. Reviewed treatment frequency and likely duration of weeks  5. Precautions and contraindications for treatment.  6. Plan of care and goals.  7. Educated on home management protocols.     Written Home Compression program Provided: Patient to have the arm in short stretch compression bandages or compression sleeve daily.         Assessment        Tiara Rasheed is a 72 y.o. female referred to outpatient occupational therapy and presents with a medical diagnosis of lymphedema secondary to breast cancer. Lymphedema, left untreated increases risk of infection, and poor body image. Patient presents with the following therapy deficits: RUE lymphedema .Following medical record review it is determined that pt will benefit from complete decongestive therapy services for the treatment and management of this chronic condition. The following goals were discussed with the patient and patient is in agreement with them as to be addressed in the treatment plan. The patient's rehab potential is Good.   Pt would continue to benefit from skilled OT. yes     Tiara is progressing well towards her goals and there are no updates to goals at  this time. Pt prognosis is Good.     Pt will continue to benefit from skilled outpatient occupational therapy to address the deficits listed in the problem list on initial evaluation provide pt/family education and to maximize pt's level of independence in the home and community environment.     Pt's spiritual, cultural and educational needs considered and pt agreeable to plan of care and goals.    Anticipated barriers to occupational therapy: duration of lymphedema and size of the arm    Goals:   Short Term Goals for 3 weeks: (phase 1 of protocol)  1. Patient and/or caregiver will demonstrate understanding of lymphedema precautions to decrease the risk of infection and lymphedema exacerbation. - Ongoing 4/13/2023   2. Patient will tolerate daily activities wearing multilayered bandaging.- Ongoing 4/13/2023   3. Patient and/or caregiver will order/obtain appropriate compression garments- Ongoing4/13/2023  4. Patient will experience a decrease in girth of affected areas of 1.0 cm- Partially met 3/23/2023      Long Term Goals for 6 weeks: (Phase 2 of goals)  1. Patient and/or caregiver will be independent with donning and doffing of compression garments.  2. Patient and/or caregiver will be independent in self-bandaging and self MLD techniques.  3. Patient and/or caregiver will be independent with HEP and lymphedema management to help prevent edema relapse and reduce risk of infection.  4. Patient will experience a decreased in girth of affected areas of 2.0 cm     PLAN     Continue plan of care 2 times weekly for 8 weeks to include the following interventions: Edema Control.    Ute Yeung OT, SOFIA

## 2023-04-18 ENCOUNTER — CLINICAL SUPPORT (OUTPATIENT)
Dept: REHABILITATION | Facility: HOSPITAL | Age: 73
End: 2023-04-18
Payer: MEDICARE

## 2023-04-18 DIAGNOSIS — I89.0 LYMPHEDEMA OF RIGHT ARM: Primary | ICD-10-CM

## 2023-04-18 PROCEDURE — 97140 MANUAL THERAPY 1/> REGIONS: CPT

## 2023-04-18 NOTE — PROGRESS NOTES
[facility]  Occupational Therapy Treatment Note    Date: 4/18/2023  Name: Tiara Rasheed  Clinic Number: [id number]    Time In: 1:00  Time Out: 2:00  Total Billable Time: 60 minutes    Therapy Diagnosis:   Encounter Diagnosis   Name Primary?    Lymphedema of right arm Yes     Physician: Shiloh Tao MD  Physician Orders: OT Eval and Treat    Evaluation Date: 2/28/2023  Insurance Authorization Period Expiration: 2/28/2024  Plan of Care Expiration: 4/24/2023  Visit # / Visits authorized:8/ 20    Precautions:  Standard and cancer    SUBJECTIVE     Pt reports: The RUE continues to be pink over the last week.  Patient is using the compression pump at home every evening.  Patient is having severe back pain.     Response to previous treatment: the RUE is softer throughout. Immediately following MLD the pinkness in the arm is lighter  Functional change: n/a    Pain: 8/10  Location: the back, neck, legs    OBJECTIVE     Pt arrived wearing nothing on the arm. The arm appears about as  pink as last session and not warm.            Objective Measures updated at progress report unless specified.    Treatment     Tiara received the treatments listed below:     Manual therapy techniques were applied for 60 minutes, including:    MANUAL LYMPHATIC DRAINAGE (MLD):    While supine with LEs elevated  over wedge, stimulation performed about the lymph nodes of the head and neck.  The entire R upper quadrant received drainage. The anterior chest wall was shunted across towards the L axilla. The  arm is full of thick pitting edema. There is persistent  pinkness but not warmth to the skin. The upper arm was treated with scooping and pumping and deep thumb [place] techniques. The cubital fossa was cleared. The forearm was treated with deep thumb [place] techniques to break down the pitting edema, as was the dorsal hand. The digits were cleared then the entire arm was shunted up through the posterior upper  arm. Patient turned to sidelying and fluid was shunted across the back towards the L axilla.     MULTILAYERED BANDAGING: bandaged R UE with cotton stockinette, 1-6cm, 1-8cm and - 10cm Rosidal K rolls dorsal hand to axilla, to leave intact 12-24 hrs as tolerated, discontinue with any problems, return rolled bandages next session. Wash and wear schedules confirmed.     Patient was encouraged to continue using her home sequential compression pump daily    Pt was educated in potential compression needs.       Discussed wear schedule, don/doff, wash and management of products.    Patient Education and Home Exercises      Education provided:   1. Educated on cause of lymphedema.  2. Explained the Complete Decongestive Therapy protocol in depth  3. Educated on Phase 1 and 2 of protocol.  4. Reviewed treatment frequency and likely duration of weeks  5. Precautions and contraindications for treatment.  6. Plan of care and goals.  7. Educated on home management protocols.     Written Home Compression program Provided: Patient to have the arm in short stretch compression bandages or compression sleeve daily.         Assessment        Tiara Rasheed is a 72 y.o. female referred to outpatient occupational therapy and presents with a medical diagnosis of lymphedema secondary to breast cancer. Lymphedema, left untreated increases risk of infection, and poor body image. Patient presents with the following therapy deficits: RUE lymphedema .Following medical record review it is determined that pt will benefit from complete decongestive therapy services for the treatment and management of this chronic condition. The following goals were discussed with the patient and patient is in agreement with them as to be addressed in the treatment plan. The patient's rehab potential is Good.   Pt would continue to benefit from skilled OT. yes     Tiara is progressing well towards her goals and there are no updates to goals at this time. Pt  prognosis is Good.     Pt will continue to benefit from skilled outpatient occupational therapy to address the deficits listed in the problem list on initial evaluation provide pt/family education and to maximize pt's level of independence in the home and community environment.     Pt's spiritual, cultural and educational needs considered and pt agreeable to plan of care and goals.    Anticipated barriers to occupational therapy: duration of lymphedema and size of the arm    Goals:   Short Term Goals for 3 weeks: (phase 1 of protocol)  1. Patient and/or caregiver will demonstrate understanding of lymphedema precautions to decrease the risk of infection and lymphedema exacerbation. - Ongoing 4/18/2023   2. Patient will tolerate daily activities wearing multilayered bandaging.- Ongoing 4/18/2023   3. Patient and/or caregiver will order/obtain appropriate compression garments- Ongoing4/18/2023  4. Patient will experience a decrease in girth of affected areas of 1.0 cm- Partially met 3/23/2023      Long Term Goals for 6 weeks: (Phase 2 of goals)  1. Patient and/or caregiver will be independent with donning and doffing of compression garments.  2. Patient and/or caregiver will be independent in self-bandaging and self MLD techniques.  3. Patient and/or caregiver will be independent with HEP and lymphedema management to help prevent edema relapse and reduce risk of infection.  4. Patient will experience a decreased in girth of affected areas of 2.0 cm     PLAN     Continue plan of care 2 times weekly for 8 weeks to include the following interventions: Edema Control.    Ute Yeung OT, SOFIA

## 2023-04-27 ENCOUNTER — CLINICAL SUPPORT (OUTPATIENT)
Dept: REHABILITATION | Facility: HOSPITAL | Age: 73
End: 2023-04-27
Payer: MEDICARE

## 2023-04-27 DIAGNOSIS — I89.0 LYMPHEDEMA OF RIGHT ARM: Primary | ICD-10-CM

## 2023-04-27 PROCEDURE — 97140 MANUAL THERAPY 1/> REGIONS: CPT

## 2023-04-27 NOTE — PROGRESS NOTES
[facility]  Occupational Therapy Treatment Note    Date: 4/27/2023  Name: Tiara Rasheed  Clinic Number: [id number]    Time In: 11:00  Time Out: 12:00  Total Billable Time: 60 minutes    Therapy Diagnosis:   Encounter Diagnosis   Name Primary?    Lymphedema of right arm Yes     Physician: Shiloh Tao MD  Physician Orders: OT Eval and Treat    Evaluation Date: 2/28/2023  Insurance Authorization Period Expiration: 12/31/2023  Plan of Care Expiration: 6/22/2023  Visit # / Visits authorized:9/ 20    Precautions:  Standard and cancer    SUBJECTIVE     Pt reports: The RUE continues to be pink over the last week.  Patient is using the compression pump at home every evening.  Patient is having severe back pain.     Response to previous treatment: the RUE is softer throughout. Immediately following MLD the pinkness in the arm is lighter  Functional change: n/a    Pain: 8/10  Location: the back, neck, legs    OBJECTIVE     Pt arrived wearing nothing on the arm. The arm appears about as  pink as last session and not warm.    UE Girth Measurements:  LANDMARK RIGHT UE  2/28/23 RightUE  4/27/23 change             E + 15cm 36.0 cm 37.5 cm +1.5 cm   E + 10cm 39.0 cm 37.0cm -2.0cm   Elbow 35.5 cm 34.5 cm -1.0 cm             W + 15cm 36.0 cm 34.0 cm -2.0 cm   W + 10cm 31.0 cm 30.0 cm -1.0 cm   Wrist 20.0 cm 20.0 cm 0 cm   DPC 19.5 cm 18.0 cm -1.5 cm   IP 6.0 cm 6.4 cm +.4 cm            Objective Measures updated at progress report unless specified.    Treatment     Tiara received the treatments listed below:     Manual therapy techniques were applied for 60 minutes, including:    MANUAL LYMPHATIC DRAINAGE (MLD):    While supine with LEs elevated  over wedge, stimulation performed about the lymph nodes of the head and neck.  The entire R upper quadrant received drainage. The anterior chest wall was shunted across towards the L axilla. The  arm is full of thick pitting edema. There is persistent   pinkness but not warmth to the skin. The upper arm was treated with scooping and pumping and deep thumb [place] techniques. The cubital fossa was cleared. The forearm was treated with deep thumb [place] techniques to break down the pitting edema, as was the dorsal hand. The digits were cleared then the entire arm was shunted up through the posterior upper arm. Patient turned to sidelying and fluid was shunted across the back towards the L axilla.     MULTILAYERED BANDAGING: bandaged R UE with cotton stockinette, 1-6cm, 1-8cm and - 10cm Rosidal K rolls dorsal hand to axilla, to leave intact 12-24 hrs as tolerated, discontinue with any problems, return rolled bandages next session. Wash and wear schedules confirmed.     Patient was encouraged to continue using her home sequential compression pump daily    Pt was educated in potential compression needs.       Discussed wear schedule, don/doff, wash and management of products.    Patient Education and Home Exercises      Education provided:   1. Educated on cause of lymphedema.  2. Explained the Complete Decongestive Therapy protocol in depth  3. Educated on Phase 1 and 2 of protocol.  4. Reviewed treatment frequency and likely duration of weeks  5. Precautions and contraindications for treatment.  6. Plan of care and goals.  7. Educated on home management protocols.     Written Home Compression program Provided: Patient to have the arm in short stretch compression bandages or compression sleeve daily.         Assessment        Tiara Rasheed is a 72 y.o. female referred to outpatient occupational therapy and presents with a medical diagnosis of lymphedema secondary to breast cancer. Lymphedema, left untreated increases risk of infection, and poor body image. Patient presents with the following therapy deficits: RUE lymphedema .Following medical record review it is determined that pt will benefit from complete decongestive therapy services for the treatment and  management of this chronic condition. The following goals were discussed with the patient and patient is in agreement with them as to be addressed in the treatment plan. The patient's rehab potential is Good.   Pt would continue to benefit from skilled OT. yes     Tiara is progressing well towards her goals and there are no updates to goals at this time. Pt prognosis is Good.     Pt will continue to benefit from skilled outpatient occupational therapy to address the deficits listed in the problem list on initial evaluation provide pt/family education and to maximize pt's level of independence in the home and community environment. Plan of care is extended to continue treatment once per week for 8 weeks thru 6/22/23.    Pt's spiritual, cultural and educational needs considered and pt agreeable to plan of care and goals.    Anticipated barriers to occupational therapy: duration of lymphedema and size of the arm    Goals:   Short Term Goals for 3 weeks: (phase 1 of protocol)  1. Patient and/or caregiver will demonstrate understanding of lymphedema precautions to decrease the risk of infection and lymphedema exacerbation. - Ongoing 4/27/2023   2. Patient will tolerate daily activities wearing multilayered bandaging.- Ongoing 4/27/2023   3. Patient and/or caregiver will order/obtain appropriate compression garments- Ongoing4/27/2023  4. Patient will experience a decrease in girth of affected areas of 1.0 cm- Partially met 4/27/2023      Long Term Goals for 6 weeks: (Phase 2 of goals)  1. Patient and/or caregiver will be independent with donning and doffing of compression garments.  2. Patient and/or caregiver will be independent in self-bandaging and self MLD techniques.  3. Patient and/or caregiver will be independent with HEP and lymphedema management to help prevent edema relapse and reduce risk of infection.  4. Patient will experience a decreased in girth of affected areas of 2.0 cm     PLAN     Continue plan of  care decreasing to 1 time weekly for 8 additional  weeks  thru 6/22/23 to include the following interventions: Edema Control.    Ute Yeung OT, MEETAR

## 2023-05-04 ENCOUNTER — CLINICAL SUPPORT (OUTPATIENT)
Dept: REHABILITATION | Facility: HOSPITAL | Age: 73
End: 2023-05-04
Payer: MEDICARE

## 2023-05-04 DIAGNOSIS — I89.0 LYMPHEDEMA OF RIGHT ARM: Primary | ICD-10-CM

## 2023-05-04 PROCEDURE — 97140 MANUAL THERAPY 1/> REGIONS: CPT

## 2023-05-04 NOTE — PROGRESS NOTES
[facility]  Occupational Therapy Treatment Note    Date: 5/4/2023  Name: Tiara Rasheed  Clinic Number: [id number]    Time In: 2:00  Time Out: 3:00  Total Billable Time: 60 minutes    Therapy Diagnosis:   Encounter Diagnosis   Name Primary?    Lymphedema of right arm Yes     Physician: Shiloh Tao MD  Physician Orders: OT Eval and Treat    Evaluation Date: 2/28/2023  Insurance Authorization Period Expiration: 12/31/2023  Plan of Care Expiration: 6/22/2023  Visit # / Visits authorized:10/ 20    Precautions:  Standard and cancer    SUBJECTIVE     Pt reports: The RUE continues to be pink over the last week.  Patient is using the compression pump at home every evening.  Patient is having continued back pain and had a terrible scare that her 's cancer had returned this week only to get good news last evening that the scan was clear.  Response to previous treatment: the RUE is softer throughout. Immediately following MLD the pinkness in the arm is lighter  Functional change: n/a    Pain: 8/10  Location: the back, neck, legs    OBJECTIVE     Pt arrived wearing nothing on the arm. The arm appears about as  pink as last session and not warm.        Objective Measures updated at progress report unless specified.    Treatment     Tiara received the treatments listed below:     Manual therapy techniques were applied for 60 minutes, including:    MANUAL LYMPHATIC DRAINAGE (MLD):    While supine with LEs elevated  over wedge, stimulation performed about the lymph nodes of the head and neck.  The entire R upper quadrant received drainage. The anterior chest wall was shunted across towards the L axilla. The  arm is full of thick pitting edema. There is persistent  pinkness but not warmth to the skin. The upper arm was treated with scooping and pumping and deep thumb [place] techniques. The cubital fossa was cleared. The forearm was treated with deep thumb [place] techniques to break down  the pitting edema, as was the dorsal hand. The digits were cleared then the entire arm was shunted up through the posterior upper arm. Patient turned to sidelying and fluid was shunted across the back towards the L axilla.     MULTILAYERED BANDAGING: bandaged R UE with cotton stockinette, 1-6cm, 1-8cm and - 10cm Rosidal K rolls dorsal hand to axilla, to leave intact 12-24 hrs as tolerated, discontinue with any problems, return rolled bandages next session. Wash and wear schedules confirmed.     Patient was encouraged to continue using her home sequential compression pump daily    Pt was educated in potential compression needs.       Discussed wear schedule, don/doff, wash and management of products.    Patient Education and Home Exercises      Education provided:   1. Educated on cause of lymphedema.  2. Explained the Complete Decongestive Therapy protocol in depth  3. Educated on Phase 1 and 2 of protocol.  4. Reviewed treatment frequency and likely duration of weeks  5. Precautions and contraindications for treatment.  6. Plan of care and goals.  7. Educated on home management protocols.     Written Home Compression program Provided: Patient to have the arm in short stretch compression bandages or compression sleeve daily.         Assessment        Tiara Rasheed is a 72 y.o. female referred to outpatient occupational therapy and presents with a medical diagnosis of lymphedema secondary to breast cancer. Lymphedema, left untreated increases risk of infection, and poor body image. Patient presents with the following therapy deficits: RUE lymphedema .Following medical record review it is determined that pt will benefit from complete decongestive therapy services for the treatment and management of this chronic condition. The following goals were discussed with the patient and patient is in agreement with them as to be addressed in the treatment plan. The patient's rehab potential is Good.   Pt would continue to  benefit from skilled OT. yes     Tiara is progressing well towards her goals and there are no updates to goals at this time. Pt prognosis is Good.     Pt will continue to benefit from skilled outpatient occupational therapy to address the deficits listed in the problem list on initial evaluation provide pt/family education and to maximize pt's level of independence in the home and community environment. Plan of care is extended to continue treatment once per week for 8 weeks thru 6/22/23.    Pt's spiritual, cultural and educational needs considered and pt agreeable to plan of care and goals.    Anticipated barriers to occupational therapy: duration of lymphedema and size of the arm    Goals:   Short Term Goals for 3 weeks: (phase 1 of protocol)  1. Patient and/or caregiver will demonstrate understanding of lymphedema precautions to decrease the risk of infection and lymphedema exacerbation. - Ongoing 5/4/2023   2. Patient will tolerate daily activities wearing multilayered bandaging.- Ongoing 5/4/2023   3. Patient and/or caregiver will order/obtain appropriate compression garments- Ongoing 5/4/2023  4. Patient will experience a decrease in girth of affected areas of 1.0 cm- Partially met 5/4/2023      Long Term Goals for 6 weeks: (Phase 2 of goals)  1. Patient and/or caregiver will be independent with donning and doffing of compression garments.  2. Patient and/or caregiver will be independent in self-bandaging and self MLD techniques.  3. Patient and/or caregiver will be independent with HEP and lymphedema management to help prevent edema relapse and reduce risk of infection.  4. Patient will experience a decreased in girth of affected areas of 2.0 cm     PLAN     Continue plan of care decreasing to 1 time weekly for 8 additional  weeks  thru 6/22/23 to include the following interventions: Edema Control.    Ute Yeung, OT, SOFIA

## 2023-05-11 ENCOUNTER — CLINICAL SUPPORT (OUTPATIENT)
Dept: REHABILITATION | Facility: HOSPITAL | Age: 73
End: 2023-05-11
Payer: MEDICARE

## 2023-05-11 DIAGNOSIS — I89.0 LYMPHEDEMA OF RIGHT ARM: Primary | ICD-10-CM

## 2023-05-11 PROCEDURE — 97140 MANUAL THERAPY 1/> REGIONS: CPT

## 2023-05-11 NOTE — PROGRESS NOTES
[facility]  Occupational Therapy Treatment Note    Date: 5/11/2023  Name: Tiara Rasheed  Clinic Number: [id number]    Time In: 1:00  Time Out: 2:00  Total Billable Time: 60 minutes    Therapy Diagnosis:   Encounter Diagnosis   Name Primary?    Lymphedema of right arm Yes     Physician: Shiloh Tao MD  Physician Orders: OT Eval and Treat    Evaluation Date: 2/28/2023  Insurance Authorization Period Expiration: 12/31/2023  Plan of Care Expiration: 6/22/2023  Visit # / Visits authorized:11/ 20    Precautions:  Standard and cancer    SUBJECTIVE     Pt reports: The RUE continues to be pink over the last week.  Patient reports that Dr. Tao said she wasn't on antibiotic long enough to treat the arm and started a new RX for 21 days.  Response to previous treatment: the RUE is softer throughout. Immediately following MLD the pinkness in the arm is lighter  Functional change: n/a    Pain: 8/10  Location: the back, neck, legs    OBJECTIVE     Pt arrived wearing nothing on the arm. The arm appears about as  pink as last session and not warm.  UE Girth Measurements:  LANDMARK RIGHT UE  2/28/23 RIGHT UE  5/11//23 change             E + 15cm 36.0 cm 35.5 cm -.5 cm   E + 10cm 39.0 cm 38.0cm -1.0cm   Elbow 35.5 cm 34.0 cm -1.5 cm             W + 15cm 36.0 cm 33.0 cm -3.0 cm   W + 10cm 31.0 cm 30.5 cm -.5 cm   Wrist 20.0 cm 20.0 cm 0cm   DPC 19.5 cm 18.5 cm -1.0cm   IP 6.0 cm 6.0 cm 0cm          Objective Measures updated at progress report unless specified.    Treatment     Tiara received the treatments listed below:     Manual therapy techniques were applied for 60 minutes, including:    MANUAL LYMPHATIC DRAINAGE (MLD):    While supine with LEs elevated  over wedge, stimulation performed about the lymph nodes of the head and neck.  The entire R upper quadrant received drainage. The anterior chest wall was shunted across towards the L axilla. The  arm is full of thick pitting edema. There  is persistent  pinkness but not warmth to the skin. The upper arm was treated with scooping and pumping and deep thumb [place] techniques. The cubital fossa was cleared. The forearm was treated with deep thumb [place] techniques to break down the pitting edema, as was the dorsal hand. The digits were cleared then the entire arm was shunted up through the posterior upper arm. Patient turned to sidelying and fluid was shunted across the back towards the L axilla.     MULTILAYERED BANDAGING: bandaged R UE with cotton stockinette, 1-6cm, 1-8cm and - 10cm Rosidal K rolls dorsal hand to axilla, to leave intact 12-24 hrs as tolerated, discontinue with any problems, return rolled bandages next session. Wash and wear schedules confirmed.     Patient was encouraged to continue using her home sequential compression pump daily    Pt was educated in potential compression needs.       Discussed wear schedule, don/doff, wash and management of products.    Patient Education and Home Exercises      Education provided:   1. Educated on cause of lymphedema.  2. Explained the Complete Decongestive Therapy protocol in depth  3. Educated on Phase 1 and 2 of protocol.  4. Reviewed treatment frequency and likely duration of weeks  5. Precautions and contraindications for treatment.  6. Plan of care and goals.  7. Educated on home management protocols.     Written Home Compression program Provided: Patient to have the arm in short stretch compression bandages or compression sleeve daily.         Assessment        Tiara Rasheed is a 72 y.o. female referred to outpatient occupational therapy and presents with a medical diagnosis of lymphedema secondary to breast cancer. Lymphedema, left untreated increases risk of infection, and poor body image. Patient presents with the following therapy deficits: RUE lymphedema .Following medical record review it is determined that pt will benefit from complete decongestive therapy services for the  treatment and management of this chronic condition. The following goals were discussed with the patient and patient is in agreement with them as to be addressed in the treatment plan. The patient's rehab potential is Good.   Pt would continue to benefit from skilled OT. yes     Tiara is progressing well towards her goals and there are no updates to goals at this time. Pt prognosis is Good.     Pt will continue to benefit from skilled outpatient occupational therapy to address the deficits listed in the problem list on initial evaluation provide pt/family education and to maximize pt's level of independence in the home and community environment. Plan of care is extended to continue treatment once per week for 8 weeks thru 6/22/23.    Pt's spiritual, cultural and educational needs considered and pt agreeable to plan of care and goals.    Anticipated barriers to occupational therapy: duration of lymphedema and size of the arm    Goals:   Short Term Goals for 3 weeks: (phase 1 of protocol)  1. Patient and/or caregiver will demonstrate understanding of lymphedema precautions to decrease the risk of infection and lymphedema exacerbation. - Ongoing 5/11/2023   2. Patient will tolerate daily activities wearing multilayered bandaging.- Ongoing 5/11/2023   3. Patient and/or caregiver will order/obtain appropriate compression garments- Ongoing 5/11/2023  4. Patient will experience a decrease in girth of affected areas of 1.0 cm- Partially met 5/11/2023      Long Term Goals for 6 weeks: (Phase 2 of goals)  1. Patient and/or caregiver will be independent with donning and doffing of compression garments.  2. Patient and/or caregiver will be independent in self-bandaging and self MLD techniques.  3. Patient and/or caregiver will be independent with HEP and lymphedema management to help prevent edema relapse and reduce risk of infection.  4. Patient will experience a decreased in girth of affected areas of 2.0 cm     PLAN      Continue plan of care decreasing to 1 time weekly for 8 additional  weeks  thru 6/22/23 to include the following interventions: Edema Control.    Ute Yeung OT, LOTR

## 2023-05-16 ENCOUNTER — CLINICAL SUPPORT (OUTPATIENT)
Dept: REHABILITATION | Facility: HOSPITAL | Age: 73
End: 2023-05-16
Payer: MEDICARE

## 2023-05-16 DIAGNOSIS — I89.0 LYMPHEDEMA OF RIGHT ARM: Primary | ICD-10-CM

## 2023-05-16 PROCEDURE — 97140 MANUAL THERAPY 1/> REGIONS: CPT

## 2023-05-16 NOTE — PROGRESS NOTES
[facility]  Occupational Therapy Treatment Note    Date: 5/16/2023  Name: Tiara Rasheed  Clinic Number: [id number]    Time In: 12:00  Time Out: 1:00  Total Billable Time: 60 minutes    Therapy Diagnosis:   Encounter Diagnosis   Name Primary?    Lymphedema of right arm Yes     Physician: Shiloh Tao MD  Physician Orders: OT Eval and Treat    Evaluation Date: 2/28/2023  Insurance Authorization Period Expiration: 12/31/2023  Plan of Care Expiration: 6/22/2023  Visit # / Visits authorized:12/ 20    Precautions:  Standard and cancer    SUBJECTIVE     Pt reports: The RUE continues to be pink over the last week despite another week of antibiotic. Response to previous treatment: the RUE is softer throughout. Immediately following MLD the pinkness in the arm is lighter  Functional change: n/a    Pain: 8/10  Location: the back, neck, legs    OBJECTIVE     Pt arrived wearing nothing on the arm. The arm appears about as  pink as last session and not warm.      Objective Measures updated at progress report unless specified.    Treatment     Tiara received the treatments listed below:     Manual therapy techniques were applied for 60 minutes, including:    MANUAL LYMPHATIC DRAINAGE (MLD):    While supine with LEs elevated  over wedge, stimulation performed about the lymph nodes of the head and neck.  The entire R upper quadrant received drainage. The anterior chest wall was shunted across towards the L axilla. The  arm is full of thick pitting edema. There is persistent  pinkness but not warmth to the skin. The upper arm was treated with scooping and pumping and deep thumb [place] techniques. The cubital fossa was cleared. The forearm was treated with deep thumb [place] techniques to break down the pitting edema, as was the dorsal hand. The digits were cleared then the entire arm was shunted up through the posterior upper arm. Patient turned to sidelying and fluid was shunted across the back  towards the L axilla.     MULTILAYERED BANDAGING: bandaged R UE with cotton stockinette, 1-6cm, 1-8cm and - 10cm Rosidal K rolls dorsal hand to axilla, to leave intact 12-24 hrs as tolerated, discontinue with any problems, return rolled bandages next session. Wash and wear schedules confirmed.     Patient was encouraged to continue using her home sequential compression pump daily    Pt was educated in potential compression needs.       Discussed wear schedule, don/doff, wash and management of products.    Patient Education and Home Exercises      Education provided:   1. Educated on cause of lymphedema.  2. Explained the Complete Decongestive Therapy protocol in depth  3. Educated on Phase 1 and 2 of protocol.  4. Reviewed treatment frequency and likely duration of weeks  5. Precautions and contraindications for treatment.  6. Plan of care and goals.  7. Educated on home management protocols.     Written Home Compression program Provided: Patient to have the arm in short stretch compression bandages or compression sleeve daily.         Assessment        Tiara Rasheed is a 72 y.o. female referred to outpatient occupational therapy and presents with a medical diagnosis of lymphedema secondary to breast cancer. Lymphedema, left untreated increases risk of infection, and poor body image. Patient presents with the following therapy deficits: RUE lymphedema .Following medical record review it is determined that pt will benefit from complete decongestive therapy services for the treatment and management of this chronic condition. The following goals were discussed with the patient and patient is in agreement with them as to be addressed in the treatment plan. The patient's rehab potential is Good.   Pt would continue to benefit from skilled OT. yes     Tiara is progressing well towards her goals and there are no updates to goals at this time. Pt prognosis is Good.     Pt will continue to benefit from skilled  outpatient occupational therapy to address the deficits listed in the problem list on initial evaluation provide pt/family education and to maximize pt's level of independence in the home and community environment. Plan of care is extended to continue treatment once per week for 8 weeks thru 6/22/23.    Pt's spiritual, cultural and educational needs considered and pt agreeable to plan of care and goals.    Anticipated barriers to occupational therapy: duration of lymphedema and size of the arm    Goals:   Short Term Goals for 3 weeks: (phase 1 of protocol)  1. Patient and/or caregiver will demonstrate understanding of lymphedema precautions to decrease the risk of infection and lymphedema exacerbation. - Ongoing 5/16/2023   2. Patient will tolerate daily activities wearing multilayered bandaging.- Ongoing 5/16/2023   3. Patient and/or caregiver will order/obtain appropriate compression garments- Ongoing 5/16/2023  4. Patient will experience a decrease in girth of affected areas of 1.0 cm- Partially met 5/16/2023      Long Term Goals for 6 weeks: (Phase 2 of goals)  1. Patient and/or caregiver will be independent with donning and doffing of compression garments.  2. Patient and/or caregiver will be independent in self-bandaging and self MLD techniques.  3. Patient and/or caregiver will be independent with HEP and lymphedema management to help prevent edema relapse and reduce risk of infection.  4. Patient will experience a decreased in girth of affected areas of 2.0 cm     PLAN     Continue plan of care decreasing to 1 time weekly for 8 additional  weeks  thru 6/22/23 to include the following interventions: Edema Control.    Ute Yeung OT, SOFIA

## 2023-05-23 ENCOUNTER — CLINICAL SUPPORT (OUTPATIENT)
Dept: REHABILITATION | Facility: HOSPITAL | Age: 73
End: 2023-05-23
Payer: MEDICARE

## 2023-05-23 DIAGNOSIS — I89.0 LYMPHEDEMA OF RIGHT ARM: Primary | ICD-10-CM

## 2023-05-23 PROCEDURE — 97140 MANUAL THERAPY 1/> REGIONS: CPT

## 2023-05-23 NOTE — PROGRESS NOTES
[facility]  Occupational Therapy Treatment Note    Date: 5/23/2023  Name: Tiara Rasheed  Clinic Number: [id number]    Time In: 2:00  Time Out: 3:00  Total Billable Time: 60 minutes    Therapy Diagnosis:   Encounter Diagnosis   Name Primary?    Lymphedema of right arm Yes     Physician: Shiloh aTo MD  Physician Orders: OT Eval and Treat    Evaluation Date: 2/28/2023  Insurance Authorization Period Expiration: 12/31/2023  Plan of Care Expiration: 6/22/2023  Visit # / Visits authorized:13/ 20    Precautions:  Standard and cancer    SUBJECTIVE     Pt reports: The RUE continues to be pink despite a new RX for 21 days.  Response to previous treatment: the RUE is notably softer throughout. Immediately following MLD the pinkness in the arm is lighter  Functional change: n/a    Pain: 8/10  Location: the back, neck, legs    OBJECTIVE     Pt arrived wearing nothing on the arm. The arm appears about as  pink as last session and not warm.  UE Girth Measurements:  LANDMARK RIGHT UE  2/28/23 RIGHT UE  5/23//23 change             E + 15cm 36.0 cm 36.0 cm 0 cm   E + 10cm 39.0 cm 38.5cm -.5cm   Elbow 35.5 cm 34.0 cm -1.5 cm             W + 15cm 36.0 cm 34.5 cm -1.5cm   W + 10cm 31.0 cm 31.0 cm 0 cm   Wrist 20.0 cm 19.0 cm -1.0cm   DPC 19.5 cm 19.0 cm -.5cm   IP 6.0 cm 6.0 cm 0cm          Objective Measures updated at progress report unless specified.    Treatment     Tiara received the treatments listed below:     Manual therapy techniques were applied for 60 minutes, including:    MANUAL LYMPHATIC DRAINAGE (MLD):    While supine with LEs elevated  over wedge, stimulation performed about the lymph nodes of the head and neck.  The entire R upper quadrant received drainage. The anterior chest wall was shunted across towards the L axilla. The  arm is full of thick pitting edema. There is persistent  pinkness but not warmth to the skin. The upper arm was treated with scooping and pumping and deep  thumb [place] techniques. The cubital fossa was cleared. The forearm was treated with deep thumb [place] techniques to break down the pitting edema, as was the dorsal hand. The digits were cleared then the entire arm was shunted up through the posterior upper arm. Patient turned to sidelying and fluid was shunted across the back towards the L axilla.     MULTILAYERED BANDAGING: bandaged R UE with cotton stockinette, 1-6cm, 1-8cm and - 10cm Rosidal K rolls dorsal hand to axilla, to leave intact 12-24 hrs as tolerated, discontinue with any problems, return rolled bandages next session. Wash and wear schedules confirmed.     Patient was encouraged to continue using her home sequential compression pump daily    Pt was educated in potential compression needs.       Discussed wear schedule, don/doff, wash and management of products.    Patient Education and Home Exercises      Education provided:   1. Educated on cause of lymphedema.  2. Explained the Complete Decongestive Therapy protocol in depth  3. Educated on Phase 1 and 2 of protocol.  4. Reviewed treatment frequency and likely duration of weeks  5. Precautions and contraindications for treatment.  6. Plan of care and goals.  7. Educated on home management protocols.     Written Home Compression program Provided: Patient to have the arm in short stretch compression bandages or compression sleeve daily.         Assessment        Tiara Rasheed is a 72 y.o. female referred to outpatient occupational therapy and presents with a medical diagnosis of lymphedema secondary to breast cancer. Lymphedema, left untreated increases risk of infection, and poor body image. Patient presents with the following therapy deficits: RUE lymphedema .Following medical record review it is determined that pt will benefit from complete decongestive therapy services for the treatment and management of this chronic condition. The following goals were discussed with the patient and patient is in  agreement with them as to be addressed in the treatment plan. The patient's rehab potential is Good.   Pt would continue to benefit from skilled OT. yes     Tiara is progressing well towards her goals and there are no updates to goals at this time. Pt prognosis is Good.     Pt will continue to benefit from skilled outpatient occupational therapy to address the deficits listed in the problem list on initial evaluation provide pt/family education and to maximize pt's level of independence in the home and community environment. Plan of care is extended to continue treatment once per week for 8 weeks thru 6/22/23.    Pt's spiritual, cultural and educational needs considered and pt agreeable to plan of care and goals.    Anticipated barriers to occupational therapy: duration of lymphedema and size of the arm    Goals:   Short Term Goals for 3 weeks: (phase 1 of protocol)  1. Patient and/or caregiver will demonstrate understanding of lymphedema precautions to decrease the risk of infection and lymphedema exacerbation. - Ongoing 5/23/2023   2. Patient will tolerate daily activities wearing multilayered bandaging.- Ongoing 5/23/2023   3. Patient and/or caregiver will order/obtain appropriate compression garments- Ongoing 5/23/2023  4. Patient will experience a decrease in girth of affected areas of 1.0 cm- Partially met 5/23/2023      Long Term Goals for 6 weeks: (Phase 2 of goals)  1. Patient and/or caregiver will be independent with donning and doffing of compression garments.  2. Patient and/or caregiver will be independent in self-bandaging and self MLD techniques.  3. Patient and/or caregiver will be independent with HEP and lymphedema management to help prevent edema relapse and reduce risk of infection.  4. Patient will experience a decreased in girth of affected areas of 2.0 cm     PLAN     Continue plan of care decreasing to 1 time weekly for 8 additional  weeks  thru 6/22/23 to include the following  interventions: Edema Control.    Ute Yeung OT, SOFIA

## 2023-06-01 ENCOUNTER — CLINICAL SUPPORT (OUTPATIENT)
Dept: REHABILITATION | Facility: HOSPITAL | Age: 73
End: 2023-06-01
Payer: MEDICARE

## 2023-06-01 DIAGNOSIS — I89.0 LYMPHEDEMA OF RIGHT ARM: Primary | ICD-10-CM

## 2023-06-01 PROCEDURE — 97140 MANUAL THERAPY 1/> REGIONS: CPT

## 2023-06-01 NOTE — PROGRESS NOTES
[facility]  Occupational Therapy Treatment Note    Date: 6/1/2023  Name: Tiara Rasheed  Clinic Number: [id number]    Time In: 11:00  Time Out: 12:00  Total Billable Time: 60 minutes    Therapy Diagnosis:   Encounter Diagnosis   Name Primary?    Lymphedema of right arm Yes     Physician: Shiloh Tao MD  Physician Orders: OT Eval and Treat    Evaluation Date: 2/28/2023  Insurance Authorization Period Expiration: 12/31/2023  Plan of Care Expiration: 6/22/2023  Visit # / Visits authorized:14/ 20    Precautions:  Standard and cancer    SUBJECTIVE     Pt reports: She had to canc her appt. On 5/30.  Patient complains of severe increase in the back/back of legs/knee pain. She is reaching out to a new neuro MD but hasn't heard back.  Response to previous treatment: the RUE is notably softer throughout. Immediately following MLD the pinkness in the arm is lighter  Functional change: n/a    Pain: 8/10  Location: the back, neck, legs    OBJECTIVE     Pt arrived wearing nothing on the arm. The arm appears about as  pink as last session and not warm.    Objective Measures updated at progress report unless specified.    Treatment     Tiara received the treatments listed below:     Manual therapy techniques were applied for 60 minutes, including:    MANUAL LYMPHATIC DRAINAGE (MLD):    While supine with LEs elevated  over wedge, stimulation performed about the lymph nodes of the head and neck.  The entire R upper quadrant received drainage. The anterior chest wall was shunted across towards the L axilla. The  arm is full of thick pitting edema. There is persistent  pinkness but not warmth to the skin. The upper arm was treated with scooping and pumping and deep thumb [place] techniques. The cubital fossa was cleared. The forearm was treated with deep thumb [place] techniques to break down the pitting edema, as was the dorsal hand. The digits were cleared then the entire arm was shunted up through  the posterior upper arm. Patient turned to sidelying and fluid was shunted across the back towards the L axilla.     MULTILAYERED BANDAGING: bandaged R UE with cotton stockinette, 1-6cm, 1-8cm and - 10cm Rosidal K rolls dorsal hand to axilla, to leave intact 12-24 hrs as tolerated, discontinue with any problems, return rolled bandages next session. Wash and wear schedules confirmed.     Patient was encouraged to continue using her home sequential compression pump daily    Pt was educated in potential compression needs.       Discussed wear schedule, don/doff, wash and management of products.    Patient Education and Home Exercises      Education provided:   1. Educated on cause of lymphedema.  2. Explained the Complete Decongestive Therapy protocol in depth  3. Educated on Phase 1 and 2 of protocol.  4. Reviewed treatment frequency and likely duration of weeks  5. Precautions and contraindications for treatment.  6. Plan of care and goals.  7. Educated on home management protocols.     Written Home Compression program Provided: Patient to have the arm in short stretch compression bandages or compression sleeve daily.         Assessment        Tiara Rasheed is a 72 y.o. female referred to outpatient occupational therapy and presents with a medical diagnosis of lymphedema secondary to breast cancer. Lymphedema, left untreated increases risk of infection, and poor body image. Patient presents with the following therapy deficits: RUE lymphedema .Following medical record review it is determined that pt will benefit from complete decongestive therapy services for the treatment and management of this chronic condition. The following goals were discussed with the patient and patient is in agreement with them as to be addressed in the treatment plan. The patient's rehab potential is Good.   Pt would continue to benefit from skilled OT. yes     Tiara is progressing well towards her goals and there are no updates to  goals at this time. Pt prognosis is Good.     Pt will continue to benefit from skilled outpatient occupational therapy to address the deficits listed in the problem list on initial evaluation provide pt/family education and to maximize pt's level of independence in the home and community environment. Plan of care is extended to continue treatment once per week for 8 weeks thru 6/22/23.    Pt's spiritual, cultural and educational needs considered and pt agreeable to plan of care and goals.    Anticipated barriers to occupational therapy: duration of lymphedema and size of the arm    Goals:   Short Term Goals for 3 weeks: (phase 1 of protocol)  1. Patient and/or caregiver will demonstrate understanding of lymphedema precautions to decrease the risk of infection and lymphedema exacerbation. - MET 6/1/2023   2. Patient will tolerate daily activities wearing multilayered bandaging.- Ongoing 6/1/2023   3. Patient and/or caregiver will order/obtain appropriate compression garments- Ongoing 6/1/2023  4. Patient will experience a decrease in girth of affected areas of 1.0 cm- Partially met 6/1/2023      Long Term Goals for 6 weeks: (Phase 2 of goals)  1. Patient and/or caregiver will be independent with donning and doffing of compression garments.  2. Patient and/or caregiver will be independent in self-bandaging and self MLD techniques.  3. Patient and/or caregiver will be independent with HEP and lymphedema management to help prevent edema relapse and reduce risk of infection.  4. Patient will experience a decreased in girth of affected areas of 2.0 cm     PLAN     Continue plan of care decreasing to 1 time weekly for 8 additional  weeks  thru 6/22/23 to include the following interventions: Edema Control.    Ute Yeung OT, SOFIA

## 2023-06-12 ENCOUNTER — CLINICAL SUPPORT (OUTPATIENT)
Dept: REHABILITATION | Facility: HOSPITAL | Age: 73
End: 2023-06-12
Payer: MEDICARE

## 2023-06-12 DIAGNOSIS — I89.0 LYMPHEDEMA OF RIGHT ARM: Primary | ICD-10-CM

## 2023-06-12 PROCEDURE — 97140 MANUAL THERAPY 1/> REGIONS: CPT

## 2023-06-12 NOTE — PROGRESS NOTES
[facility]  Occupational Therapy Treatment Note    Date: 6/12/2023  Name: Tiara Rasheed  Clinic Number: [id number]    Time In: 1:00  Time Out: 2:00  Total Billable Time: 60 minutes    Therapy Diagnosis:   Encounter Diagnosis   Name Primary?    Lymphedema of right arm Yes     Physician: Shiloh Tao MD  Physician Orders: OT Eval and Treat    Evaluation Date: 2/28/2023  Insurance Authorization Period Expiration: 12/31/2023  Plan of Care Expiration: 6/22/2023  Visit # / Visits authorized:15/ 20    Precautions:  Standard and cancer    SUBJECTIVE     Pt reports: She complains of severe increase in the back/back of legs/knee pain. She is scheduled to see a new neuro MD at the end of the month.   Response to previous treatment: the RUE is notably softer throughout. Immediately following MLD the pinkness in the arm is lighter  Functional change: n/a    Pain: 8/10  Location: the back, neck, legs    OBJECTIVE     Pt arrived wearing nothing on the arm. The arm appears about as  pink as last session and not warm.    Objective Measures updated at progress report unless specified.    Treatment     Tiara received the treatments listed below:     Manual therapy techniques were applied for 60 minutes, including:    MANUAL LYMPHATIC DRAINAGE (MLD):    While supine with LEs elevated  over wedge, stimulation performed about the lymph nodes of the head and neck.  The entire R upper quadrant received drainage. The anterior chest wall was shunted across towards the L axilla. The  arm is full of soft pitting edema. There is persistent  pinkness but not warmth to the skin. The upper arm was treated with scooping and pumping and deep thumb [place] techniques. The cubital fossa was cleared. The forearm was treated with deep thumb [place] techniques to break down the pitting edema, as was the dorsal hand. The digits were cleared then the entire arm was shunted up through the posterior upper arm. Patient  turned to sidelying and fluid was shunted across the back towards the L axilla.     MULTILAYERED BANDAGING: bandaged R UE with cotton stockinette, 1-6cm, 1-8cm and - 10cm  to leave intact 12-24 hrs as tolerated, discontinue with any problems, return rolled bandages next session. Wash and wear schedules confirmed.     Patient was encouraged to continue using her home sequential compression pump daily    Pt was educated in potential compression needs.       Discussed wear schedule, don/doff, wash and management of products.    Patient Education and Home Exercises      Education provided:   1. Educated on cause of lymphedema.  2. Explained the Complete Decongestive Therapy protocol in depth  3. Educated on Phase 1 and 2 of protocol.  4. Reviewed treatment frequency and likely duration of weeks  5. Precautions and contraindications for treatment.  6. Plan of care and goals.  7. Educated on home management protocols.     Written Home Compression program Provided: Patient to have the arm in short stretch compression bandages or compression sleeve daily.         Assessment        Tiara Rasheed is a 72 y.o. female referred to outpatient occupational therapy and presents with a medical diagnosis of lymphedema secondary to breast cancer. Lymphedema, left untreated increases risk of infection, and poor body image. Patient presents with the following therapy deficits: RUE lymphedema .Following medical record review it is determined that pt will benefit from complete decongestive therapy services for the treatment and management of this chronic condition. The following goals were discussed with the patient and patient is in agreement with them as to be addressed in the treatment plan. The patient's rehab potential is Good.   Pt would continue to benefit from skilled OT. yes     Tiara is progressing well towards her goals and there are no updates to goals at this time. Pt prognosis is Good.     Pt will continue to benefit  from skilled outpatient occupational therapy to address the deficits listed in the problem list on initial evaluation provide pt/family education and to maximize pt's level of independence in the home and community environment. Plan of care is extended to continue treatment once per week for 8 weeks thru 6/22/23.    Pt's spiritual, cultural and educational needs considered and pt agreeable to plan of care and goals.    Anticipated barriers to occupational therapy: duration of lymphedema and size of the arm    Goals:   Short Term Goals for 3 weeks: (phase 1 of protocol)  1. Patient and/or caregiver will demonstrate understanding of lymphedema precautions to decrease the risk of infection and lymphedema exacerbation. - MET 6/1/2023   2. Patient will tolerate daily activities wearing multilayered bandaging.- Ongoing 6/1/2023   3. Patient and/or caregiver will order/obtain appropriate compression garments- Ongoing 6/1/2023  4. Patient will experience a decrease in girth of affected areas of 1.0 cm- Partially met 6/1/2023      Long Term Goals for 6 weeks: (Phase 2 of goals)  1. Patient and/or caregiver will be independent with donning and doffing of compression garments.  2. Patient and/or caregiver will be independent in self-bandaging and self MLD techniques.  3. Patient and/or caregiver will be independent with HEP and lymphedema management to help prevent edema relapse and reduce risk of infection.  4. Patient will experience a decreased in girth of affected areas of 2.0 cm     PLAN     Continue plan of care decreasing to 1 time weekly for 8 additional  weeks  thru 6/22/23 to include the following interventions: Edema Control.    Ute Yeung OT, SOFIA

## 2023-06-20 ENCOUNTER — CLINICAL SUPPORT (OUTPATIENT)
Dept: REHABILITATION | Facility: HOSPITAL | Age: 73
End: 2023-06-20
Payer: MEDICARE

## 2023-06-20 DIAGNOSIS — I89.0 LYMPHEDEMA OF RIGHT ARM: Primary | ICD-10-CM

## 2023-06-20 PROCEDURE — 97140 MANUAL THERAPY 1/> REGIONS: CPT

## 2023-06-20 NOTE — PROGRESS NOTES
[facility]  Occupational Therapy Treatment Note    Date: 6/20/2023  Name: Tiara Rasheed  Clinic Number: [id number]    Time In: 2:00  Time Out: 3:00  Total Billable Time: 60 minutes    Therapy Diagnosis:   Encounter Diagnosis   Name Primary?    Lymphedema of right arm Yes     Physician: Shiloh Tao MD  Physician Orders: OT Eval and Treat    Evaluation Date: 2/28/2023  Insurance Authorization Period Expiration: 12/31/2023  Plan of Care Expiration: 8/14/2023  Visit # / Visits authorized:16/ 28    Precautions:  Standard and cancer    SUBJECTIVE     Pt reports: She complains of severe pain in the back/back of legs/knee. She is scheduled to see a new neuro MD at the end of the month. She is walking 1 mile per day.  Response to previous treatment: the RUE is notably softer throughout. Immediately following MLD the pinkness in the arm is lighter  Functional change: n/a    Pain: 8/10  Location: the back, neck, legs    OBJECTIVE     Pt arrived wearing nothing on the arm. The arm appears about as  pink as last session and not warm.  UE Girth Measurements:  LANDMARK RIGHT UE  2/28/23 RIGHT UE  6/20/23 change  From eval             E + 15cm 36.0 cm 34.0 cm -2.0 cm   E + 10cm 39.0 cm 35.5 cm -4.5 cm   Elbow 35.5 cm 32.5 cm -3.0 cm             W + 15cm 36.0 cm 34.0 cm -2.0 cm   W + 10cm 31.0 cm 32.0 cm +1.0 cm   Wrist 20.0 cm 19.5 cm -.5cm   DPC 19.5 cm 19.0 cm -.5 cm   IP 6.0 cm 5.5 cm -.5cm        Objective Measures updated at progress report unless specified.    Treatment     Tiara received the treatments listed below:     Manual therapy techniques were applied for 60 minutes, including:    MANUAL LYMPHATIC DRAINAGE (MLD):    While supine with LEs elevated  over wedge, stimulation performed about the lymph nodes of the head and neck.  The entire R upper quadrant received drainage. The anterior chest wall was shunted across towards the L axilla. The  arm is full of soft pitting edema. There  is persistent  pinkness but not warmth to the skin. The upper arm was treated with scooping and pumping and deep thumb [place] techniques. The cubital fossa was cleared. The forearm was treated with deep thumb [place] techniques to break down the pitting edema, as was the dorsal hand. The digits were cleared then the entire arm was shunted up through the posterior upper arm. Patient turned to sidelying and fluid was shunted across the back towards the L axilla.     MULTILAYERED BANDAGING: bandaged R UE with cotton stockinette, 1-6cm, 1-8cm and - 10cm  to leave intact 12-24 hrs as tolerated, discontinue with any problems, return rolled bandages next session. Wash and wear schedules confirmed.     Patient was encouraged to continue using her home sequential compression pump daily    Pt was educated in potential compression needs.       Discussed wear schedule, don/doff, wash and management of products.    Patient Education and Home Exercises      Education provided:   1. Educated on cause of lymphedema.  2. Explained the Complete Decongestive Therapy protocol in depth  3. Educated on Phase 1 and 2 of protocol.  4. Reviewed treatment frequency and likely duration of weeks  5. Precautions and contraindications for treatment.  6. Plan of care and goals.  7. Educated on home management protocols.     Written Home Compression program Provided: Patient to have the arm in short stretch compression bandages or compression sleeve daily.         Assessment        Tiara Rasheed is a 72 y.o. female referred to outpatient occupational therapy and presents with a medical diagnosis of lymphedema secondary to breast cancer. Lymphedema, left untreated increases risk of infection, and poor body image. Patient presents with the following therapy deficits: RUE lymphedema .Following medical record review it is determined that pt will benefit from complete decongestive therapy services for the treatment and management of this chronic  condition. The following goals were discussed with the patient and patient is in agreement with them as to be addressed in the treatment plan. The patient's rehab potential is Good.   Pt would continue to benefit from skilled OT. yes     Tiara is progressing well towards her goals and there are no updates to goals at this time. Pt prognosis is Good.     Pt will continue to benefit from skilled outpatient occupational therapy to address the deficits listed in the problem list on initial evaluation provide pt/family education and to maximize pt's level of independence in the home and community environment. Plan of care is extended to continue treatment once per week for 8 weeks thru 6/22/23.    Pt's spiritual, cultural and educational needs considered and pt agreeable to plan of care and goals.    Anticipated barriers to occupational therapy: duration of lymphedema and size of the arm    Goals:   Short Term Goals for 3 weeks: (phase 1 of protocol)  1. Patient and/or caregiver will demonstrate understanding of lymphedema precautions to decrease the risk of infection and lymphedema exacerbation. - MET 6/1/2023   2. Patient will tolerate daily activities wearing multilayered bandaging.- Ongoing 6/1/2023   3. Patient and/or caregiver will order/obtain appropriate compression garments- Ongoing 6/1/2023  4. Patient will experience a decrease in girth of affected areas of 1.0 cm- Partially met 6/1/2023      Long Term Goals for 6 weeks: (Phase 2 of goals)  1. Patient and/or caregiver will be independent with donning and doffing of compression garments.  2. Patient and/or caregiver will be independent in self-bandaging and self MLD techniques.  3. Patient and/or caregiver will be independent with HEP and lymphedema management to help prevent edema relapse and reduce risk of infection.  4. Patient will experience a decreased in girth of affected areas of 2.0 cm     PLAN     Continue plan of care  1 time weekly for 8  additional  weeks 6/20/23 thru 8/14/23 to include the following interventions: Edema Control.    Ute Yeung OT, MEETAR

## 2023-07-06 ENCOUNTER — CLINICAL SUPPORT (OUTPATIENT)
Dept: REHABILITATION | Facility: HOSPITAL | Age: 73
End: 2023-07-06
Payer: MEDICARE

## 2023-07-06 DIAGNOSIS — I89.0 LYMPHEDEMA OF RIGHT ARM: Primary | ICD-10-CM

## 2023-07-06 PROCEDURE — 97140 MANUAL THERAPY 1/> REGIONS: CPT

## 2023-07-06 NOTE — PROGRESS NOTES
[facility]  Occupational Therapy Treatment Note    Date: 7/6/2023  Name: Tiara Rasheed  Clinic Number: [id number]    Time In: 2:00  Time Out: 3:00  Total Billable Time: 60 minutes    Therapy Diagnosis:   Encounter Diagnosis   Name Primary?    Lymphedema of right arm Yes     Physician: Shiloh Tao MD  Physician Orders: OT Eval and Treat    Evaluation Date: 2/28/2023  Insurance Authorization Period Expiration: 12/31/2023  Plan of Care Expiration: 8/14/2023  Visit # / Visits authorized:17/ 28    Precautions:  Standard and cancer    SUBJECTIVE     Pt reports: She complains of slightly less pain in the back/back of legs/knee. Patient was ill with stomach bug for a few days and is back to walking 1 mile per day.  Response to previous treatment: the RUE is notably softer throughout. Immediately following MLD the pinkness in the arm is lighter  Functional change: n/a    Pain: 7/10  Location: the back, neck, legs    OBJECTIVE     Pt arrived wearing nothing on the arm. The arm appears about as  pink as last session and not warm.    Objective Measures updated at progress report unless specified.    Treatment     Tiara received the treatments listed below:     Manual therapy techniques were applied for 60 minutes, including:    MANUAL LYMPHATIC DRAINAGE (MLD):    While supine with LEs elevated  over wedge, stimulation performed about the lymph nodes of the head and neck.  The entire R upper quadrant received drainage. The anterior chest wall was shunted across towards the L axilla. The  arm is full of soft pitting edema. There is persistent  pinkness but not warmth to the skin. The upper arm was treated with scooping and pumping and deep thumb [place] techniques. The cubital fossa was cleared. The forearm was treated with deep thumb [place] techniques to break down the pitting edema, as was the dorsal hand. The digits were cleared then the entire arm was shunted up through the posterior  upper arm. Patient turned to sidelying and fluid was shunted across the back towards the L axilla.     MULTILAYERED BANDAGING: bandaged R UE with cotton stockinette, 1-6cm, 1-8cm and - 10cm  to leave intact 12-24 hrs as tolerated, discontinue with any problems, return rolled bandages next session. Wash and wear schedules confirmed.     Patient was encouraged to continue using her home sequential compression pump daily    Pt was educated in potential compression needs.       Discussed wear schedule, don/doff, wash and management of products.    Patient Education and Home Exercises      Education provided:   1. Educated on cause of lymphedema.  2. Explained the Complete Decongestive Therapy protocol in depth  3. Educated on Phase 1 and 2 of protocol.  4. Reviewed treatment frequency and likely duration of weeks  5. Precautions and contraindications for treatment.  6. Plan of care and goals.  7. Educated on home management protocols.     Written Home Compression program Provided: Patient to have the arm in short stretch compression bandages or compression sleeve daily.         Assessment        Tiara Rasheed is a 72 y.o. female referred to outpatient occupational therapy and presents with a medical diagnosis of lymphedema secondary to breast cancer. Lymphedema, left untreated increases risk of infection, and poor body image. Patient presents with the following therapy deficits: RUE lymphedema .Following medical record review it is determined that pt will benefit from complete decongestive therapy services for the treatment and management of this chronic condition. The following goals were discussed with the patient and patient is in agreement with them as to be addressed in the treatment plan. The patient's rehab potential is Good.   Pt would continue to benefit from skilled OT. yes     Tiara is progressing well towards her goals and there are no updates to goals at this time. Pt prognosis is Good.     Pt will  continue to benefit from skilled outpatient occupational therapy to address the deficits listed in the problem list on initial evaluation provide pt/family education and to maximize pt's level of independence in the home and community environment. Plan of care is extended to continue treatment once per week for 8 weeks thru 6/22/23.    Pt's spiritual, cultural and educational needs considered and pt agreeable to plan of care and goals.    Anticipated barriers to occupational therapy: duration of lymphedema and size of the arm    Goals:   Short Term Goals for 3 weeks: (phase 1 of protocol)  1. Patient and/or caregiver will demonstrate understanding of lymphedema precautions to decrease the risk of infection and lymphedema exacerbation. - MET 6/1/2023   2. Patient will tolerate daily activities wearing multilayered bandaging.- Ongoing 7/6/2023   3. Patient and/or caregiver will order/obtain appropriate compression garments- Ongoing 7/6/2023  4. Patient will experience a decrease in girth of affected areas of 1.0 cm- Partially met 7/6/2023      Long Term Goals for 6 weeks: (Phase 2 of goals)  1. Patient and/or caregiver will be independent with donning and doffing of compression garments.  2. Patient and/or caregiver will be independent in self-bandaging and self MLD techniques.  3. Patient and/or caregiver will be independent with HEP and lymphedema management to help prevent edema relapse and reduce risk of infection.  4. Patient will experience a decreased in girth of affected areas of 2.0 cm     PLAN     Continue plan of care  1 time weekly for 8 additional  weeks 6/20/23 thru 8/14/23 to include the following interventions: Edema Control.    Ute Yeung OT, SOFIA

## 2023-07-11 ENCOUNTER — CLINICAL SUPPORT (OUTPATIENT)
Dept: REHABILITATION | Facility: HOSPITAL | Age: 73
End: 2023-07-11
Payer: MEDICARE

## 2023-07-11 DIAGNOSIS — I89.0 LYMPHEDEMA OF RIGHT ARM: Primary | ICD-10-CM

## 2023-07-11 PROCEDURE — 97140 MANUAL THERAPY 1/> REGIONS: CPT

## 2023-07-11 NOTE — PROGRESS NOTES
[facility]  Occupational Therapy Treatment Note    Date: 7/11/2023  Name: Tiara Rasheed  Clinic Number: [id number]    Time In: 2:00  Time Out: 3:00  Total Billable Time: 60 minutes    Therapy Diagnosis:   Encounter Diagnosis   Name Primary?    Lymphedema of right arm Yes     Physician: Shiloh Tao MD  Physician Orders: OT Eval and Treat    Evaluation Date: 2/28/2023  Insurance Authorization Period Expiration: 12/31/2023  Plan of Care Expiration: 8/14/2023  Visit # / Visits authorized:18/ 28    Precautions:  Standard and cancer    SUBJECTIVE     Pt reports: She reports no longer wearing the compression sleeve because she forgets about it. She is wearing the short stretch compression bandages until bed on the day of therapy and is using the compression pump every day except when she comes to therapy.  Response to previous treatment: the RUE is notably softer throughout. Immediately following MLD the pinkness in the arm is lighter  Functional change: n/a    Pain: 7/10  Location: the back, neck, legs    OBJECTIVE     Pt arrived wearing nothing on the arm. The arm appears about as  pink as last session and not warm.    Objective Measures updated at progress report unless specified.    Treatment     Tiara received the treatments listed below:     Manual therapy techniques were applied for 60 minutes, including:    MANUAL LYMPHATIC DRAINAGE (MLD):    While supine with LEs elevated  over wedge, stimulation performed about the lymph nodes of the head and neck.  The entire R upper quadrant received drainage. The anterior chest wall was shunted across towards the L axilla. The  arm is full of soft pitting edema. There is persistent  pinkness but not warmth to the skin. The upper arm was treated with scooping and pumping and deep thumb [place] techniques. The cubital fossa was cleared. The forearm was treated with deep thumb [place] techniques to break down the pitting edema, as was the  dorsal hand. The digits were cleared then the entire arm was shunted up through the posterior upper arm. Patient turned to sidelying and fluid was shunted across the back towards the L axilla.     MULTILAYERED BANDAGING: bandaged R UE with cotton stockinette, 1-6cm, 1-8cm and - 10cm  to leave intact 12-24 hrs as tolerated, discontinue with any problems, return rolled bandages next session. Wash and wear schedules confirmed.     Patient was encouraged to continue using her home sequential compression pump daily    Pt was educated in potential compression needs.       Discussed wear schedule, don/doff, wash and management of products.    Patient Education and Home Exercises      Education provided:   1. Educated on cause of lymphedema.  2. Explained the Complete Decongestive Therapy protocol in depth  3. Educated on Phase 1 and 2 of protocol.  4. Reviewed treatment frequency and likely duration of weeks  5. Precautions and contraindications for treatment.  6. Plan of care and goals.  7. Educated on home management protocols.     Written Home Compression program Provided: Patient to have the arm in short stretch compression bandages or compression sleeve daily.         Assessment        Tiara Rasheed is a 72 y.o. female referred to outpatient occupational therapy and presents with a medical diagnosis of lymphedema secondary to breast cancer. Lymphedema, left untreated increases risk of infection, and poor body image. Patient presents with the following therapy deficits: RUE lymphedema .Following medical record review it is determined that pt will benefit from complete decongestive therapy services for the treatment and management of this chronic condition. The following goals were discussed with the patient and patient is in agreement with them as to be addressed in the treatment plan. The patient's rehab potential is Good.   Pt would continue to benefit from skilled OT. yes     Tiara is progressing well towards  her goals and there are no updates to goals at this time. Pt prognosis is Good.     Pt will continue to benefit from skilled outpatient occupational therapy to address the deficits listed in the problem list on initial evaluation provide pt/family education and to maximize pt's level of independence in the home and community environment. Plan of care is extended to continue treatment once per week for 8 weeks thru 8/14/23.    Pt's spiritual, cultural and educational needs considered and pt agreeable to plan of care and goals.    Anticipated barriers to occupational therapy: duration of lymphedema and size of the arm    Goals:   Short Term Goals for 3 weeks: (phase 1 of protocol)  1. Patient and/or caregiver will demonstrate understanding of lymphedema precautions to decrease the risk of infection and lymphedema exacerbation. - MET 6/1/2023   2. Patient will tolerate daily activities wearing multilayered bandaging.- Ongoing 7/112023   3. Patient and/or caregiver will order/obtain appropriate compression garments- Ongoing 7/112023  4. Patient will experience a decrease in girth of affected areas of 1.0 cm- Partially met 7/6/2023      Long Term Goals for 6 weeks: (Phase 2 of goals)  1. Patient and/or caregiver will be independent with donning and doffing of compression garments.  2. Patient and/or caregiver will be independent in self-bandaging and self MLD techniques.  3. Patient and/or caregiver will be independent with HEP and lymphedema management to help prevent edema relapse and reduce risk of infection.  4. Patient will experience a decreased in girth of affected areas of 2.0 cm     PLAN     Continue plan of care  1 time weekly for 8 additional  weeks 6/20/23 thru 8/14/23 to include the following interventions: Edema Control.    Ute Yeung OT, SOFIA

## 2023-07-18 ENCOUNTER — CLINICAL SUPPORT (OUTPATIENT)
Dept: REHABILITATION | Facility: HOSPITAL | Age: 73
End: 2023-07-18
Payer: MEDICARE

## 2023-07-18 DIAGNOSIS — I89.0 LYMPHEDEMA OF RIGHT ARM: Primary | ICD-10-CM

## 2023-07-18 PROCEDURE — 97140 MANUAL THERAPY 1/> REGIONS: CPT

## 2023-07-18 NOTE — PROGRESS NOTES
[facility]  Occupational Therapy Treatment Note    Date: 7/18/2023  Name: Tiara Rasheed  Clinic Number: [id number]    Time In: 2:00  Time Out: 3:00  Total Billable Time: 60 minutes    Therapy Diagnosis:   Encounter Diagnosis   Name Primary?    Lymphedema of right arm Yes     Physician: Shiloh Tao MD  Physician Orders: OT Eval and Treat    Evaluation Date: 2/28/2023  Insurance Authorization Period Expiration: 12/31/2023  Plan of Care Expiration: 8/14/2023  Visit # / Visits authorized:19/ 28    Precautions:  Standard and cancer    SUBJECTIVE     Pt reports: She is wearing the short stretch compression bandages until bed on the day of therapy and is using the compression pump every day except when she comes to therapy.  Patient reports decreased ability to walk due to R knee and L hip pain, saw a new ortho MD today who thinks she may have L hip bursitis.  Response to previous treatment: the RUE is notably softer throughout. Immediately following MLD the pinkness in the arm is lighter  Functional change: n/a    Pain: 7/10  Location: the back, neck, legs    OBJECTIVE     Pt arrived wearing nothing on the arm. The arm appears about as  pink as last session and not warm.    Objective Measures updated at progress report unless specified.    Treatment     Tiara received the treatments listed below:     Manual therapy techniques were applied for 60 minutes, including:    MANUAL LYMPHATIC DRAINAGE (MLD):    While supine with LEs elevated  over wedge, stimulation performed about the lymph nodes of the head and neck.  The entire R upper quadrant received drainage. The anterior chest wall was shunted across towards the L axilla. The  arm is full of soft pitting edema. There is persistent  pinkness but not warmth to the skin. The upper arm was treated with scooping and pumping and deep thumb [place] techniques. The cubital fossa was cleared. The forearm was treated with deep thumb [place]  techniques to break down the pitting edema, as was the dorsal hand. The digits were cleared then the entire arm was shunted up through the posterior upper arm. Patient turned to sidelying and fluid was shunted across the back towards the L axilla.     MULTILAYERED BANDAGING: bandaged R UE with cotton stockinette, 1-6cm, 1-8cm and - 10cm  to leave intact 12-24 hrs as tolerated, discontinue with any problems, return rolled bandages next session. Wash and wear schedules confirmed.     Patient was encouraged to continue using her home sequential compression pump daily    Pt was educated in potential compression needs.       Discussed wear schedule, don/doff, wash and management of products.    Patient Education and Home Exercises      Education provided:   1. Educated on cause of lymphedema.  2. Explained the Complete Decongestive Therapy protocol in depth  3. Educated on Phase 1 and 2 of protocol.  4. Reviewed treatment frequency and likely duration of weeks  5. Precautions and contraindications for treatment.  6. Plan of care and goals.  7. Educated on home management protocols.     Written Home Compression program Provided: Patient to have the arm in short stretch compression bandages or compression sleeve daily.         Assessment        Tiara Rasheed is a 72 y.o. female referred to outpatient occupational therapy and presents with a medical diagnosis of lymphedema secondary to breast cancer. Lymphedema, left untreated increases risk of infection, and poor body image. Patient presents with the following therapy deficits: RUE lymphedema .Following medical record review it is determined that pt will benefit from complete decongestive therapy services for the treatment and management of this chronic condition. The following goals were discussed with the patient and patient is in agreement with them as to be addressed in the treatment plan. The patient's rehab potential is Good.   Pt would continue to benefit from  skilled OT. yes     Tiara is progressing well towards her goals and there are no updates to goals at this time. Pt prognosis is Good.     Pt will continue to benefit from skilled outpatient occupational therapy to address the deficits listed in the problem list on initial evaluation provide pt/family education and to maximize pt's level of independence in the home and community environment. Plan of care is extended to continue treatment once per week for 8 weeks thru 8/14/23.    Pt's spiritual, cultural and educational needs considered and pt agreeable to plan of care and goals.    Anticipated barriers to occupational therapy: duration of lymphedema and size of the arm    Goals:   Short Term Goals for 3 weeks: (phase 1 of protocol)  1. Patient and/or caregiver will demonstrate understanding of lymphedema precautions to decrease the risk of infection and lymphedema exacerbation. - MET 6/1/2023   2. Patient will tolerate daily activities wearing multilayered bandaging.- Ongoing 7/18/2023   3. Patient and/or caregiver will order/obtain appropriate compression garments- Ongoing 7/18/2023  4. Patient will experience a decrease in girth of affected areas of 1.0 cm- Partially met 7/6/2023      Long Term Goals for 6 weeks: (Phase 2 of goals)  1. Patient and/or caregiver will be independent with donning and doffing of compression garments.  2. Patient and/or caregiver will be independent in self-bandaging and self MLD techniques.  3. Patient and/or caregiver will be independent with HEP and lymphedema management to help prevent edema relapse and reduce risk of infection.  4. Patient will experience a decreased in girth of affected areas of 2.0 cm     PLAN     Continue plan of care  1 time weekly for 8 additional  weeks 6/20/23 thru 8/14/23 to include the following interventions: Edema Control.    Ute Yeung, OT, SOFIA

## 2023-07-25 ENCOUNTER — CLINICAL SUPPORT (OUTPATIENT)
Dept: REHABILITATION | Facility: HOSPITAL | Age: 73
End: 2023-07-25
Payer: MEDICARE

## 2023-07-25 DIAGNOSIS — I89.0 LYMPHEDEMA OF RIGHT ARM: Primary | ICD-10-CM

## 2023-07-25 PROCEDURE — 97140 MANUAL THERAPY 1/> REGIONS: CPT

## 2023-07-25 NOTE — PROGRESS NOTES
[facility]  Occupational Therapy Treatment Note    Date: 7/25/2023  Name: Tiara Rasheed  Clinic Number: [id number]    Time In: 2:15  Time Out: 3:15  Total Billable Time: 60 minutes    Therapy Diagnosis:   Encounter Diagnosis   Name Primary?    Lymphedema of right arm Yes     Physician: Shiloh Tao MD  Physician Orders: OT Eval and Treat    Evaluation Date: 2/28/2023  Insurance Authorization Period Expiration: 12/31/2023  Plan of Care Expiration: 8/14/2023  Visit # / Visits [id number]/ 28    Precautions:  Standard and cancer    SUBJECTIVE     Pt reports: She  is using the compression pump every day except when she comes to therapy.  Patient reports decreased ability to walk due to R knee and L hip pain  Response to previous treatment: the RUE is notably softer throughout. The pinkness in the arm is lighter prior to treatment this date.    Functional change: n/a    Pain: 7/10  Location: the back, neck, legs    OBJECTIVE     Pt arrived wearing nothing on the arm. The arm appears notably less pink as compared to last session and not warm.  UE Girth Measurements:  LANDMARK RIGHT UE  2/28/23 RIGHT UE  7/25/23 change             E + 15cm 36.0 cm 32.5 cm -3.5 cm   E + 10cm 39.0 cm 34.0 cm -5.0cm   Elbow 35.5 cm 32.0 cm -3.5 cm             W + 15cm 36.0 cm 33.0 cm -3.0 cm   W + 10cm 31.0 cm 30.0 cm -1.0 cm   Wrist 20.0 cm 18.5 cm -1.5 cm   DPC 19.5 cm 18.0 cm -1.5 cm   IP 6.0 cm 5.5 cm -0.5cm         Objective Measures updated at progress report unless specified.    Treatment     Tiara received the treatments listed below:     Manual therapy techniques were applied for 60 minutes, including:    MANUAL LYMPHATIC DRAINAGE (MLD):    While supine with LEs elevated  over wedge, stimulation performed about the lymph nodes of the head and neck.  The entire R upper quadrant received drainage. The anterior chest wall was shunted across towards the L axilla. The  arm is full of soft pitting  edema. There is persistent  pinkness but not warmth to the skin. The upper arm was treated with scooping and pumping and deep thumb [place] techniques. The cubital fossa was cleared. The forearm was treated with deep thumb [place] techniques to break down the pitting edema, as was the dorsal hand. The digits were cleared then the entire arm was shunted up through the posterior upper arm. Patient turned to sidelying and fluid was shunted across the back towards the L axilla.     MULTILAYERED BANDAGING: bandaged R UE with cotton stockinette, 1-6cm, 1-8cm and - 10cm  to leave intact 12-24 hrs as tolerated, discontinue with any problems, return rolled bandages next session. Wash and wear schedules confirmed.     Patient was encouraged to continue using her home sequential compression pump daily    Pt was educated in potential compression needs.       Discussed wear schedule, don/doff, wash and management of products.    Patient Education and Home Exercises      Education provided:   1. Educated on cause of lymphedema.  2. Explained the Complete Decongestive Therapy protocol in depth  3. Educated on Phase 1 and 2 of protocol.  4. Reviewed treatment frequency and likely duration of weeks  5. Precautions and contraindications for treatment.  6. Plan of care and goals.  7. Educated on home management protocols.     Written Home Compression program Provided: Patient to have the arm in short stretch compression bandages or compression sleeve daily.         Assessment        Tiara Rasheed is a 72 y.o. female referred to outpatient occupational therapy and presents with a medical diagnosis of lymphedema secondary to breast cancer. Lymphedema, left untreated increases risk of infection, and poor body image. Patient presents with the following therapy deficits: RUE lymphedema .Following medical record review it is determined that pt will benefit from complete decongestive therapy services for the treatment and management of this  chronic condition. The following goals were discussed with the patient and patient is in agreement with them as to be addressed in the treatment plan. The patient's rehab potential is Good.   Pt would continue to benefit from skilled OT. yes     Tiara is progressing well towards her goals and there are no updates to goals at this time. Pt prognosis is Good.     Pt will continue to benefit from skilled outpatient occupational therapy to address the deficits listed in the problem list on initial evaluation provide pt/family education and to maximize pt's level of independence in the home and community environment. Plan of care is extended to continue treatment once per week for 8 weeks thru 8/14/23.    Pt's spiritual, cultural and educational needs considered and pt agreeable to plan of care and goals.    Anticipated barriers to occupational therapy: duration of lymphedema and size of the arm    Goals:   Short Term Goals for 3 weeks: (phase 1 of protocol)  1. Patient and/or caregiver will demonstrate understanding of lymphedema precautions to decrease the risk of infection and lymphedema exacerbation. - MET 6/1/2023   2. Patient will tolerate daily activities wearing multilayered bandaging.- Ongoing 7/25/2023   3. Patient and/or caregiver will order/obtain appropriate compression garments- Ongoing 7/25/2023  4. Patient will experience a decrease in girth of affected areas of 1.0 cm- Partially met 7/6/2023      Long Term Goals for 6 weeks: (Phase 2 of goals)  1. Patient and/or caregiver will be independent with donning and doffing of compression garments.  2. Patient and/or caregiver will be independent in self-bandaging and self MLD techniques.  3. Patient and/or caregiver will be independent with HEP and lymphedema management to help prevent edema relapse and reduce risk of infection.  4. Patient will experience a decreased in girth of affected areas of 2.0 cm     PLAN     Continue plan of care  1 time weekly for  8 additional  weeks 6/20/23 thru 8/14/23 to include the following interventions: Edema Control.    Ute Yeung OT, MEETAR

## 2023-08-03 ENCOUNTER — CLINICAL SUPPORT (OUTPATIENT)
Dept: REHABILITATION | Facility: HOSPITAL | Age: 73
End: 2023-08-03
Payer: MEDICARE

## 2023-08-03 DIAGNOSIS — I89.0 LYMPHEDEMA OF RIGHT ARM: Primary | ICD-10-CM

## 2023-08-03 PROCEDURE — 97140 MANUAL THERAPY 1/> REGIONS: CPT

## 2023-08-03 NOTE — PROGRESS NOTES
[facility] OUTPATIENT THERAPY AND WELLNESS  Occupational Therapy Treatment Note    Date: 8/3/2023  Name: Tiara Rasheed  Clinic Number: [id number]    Time In: 2:10  Time Out: 3:10  Total Billable Time: 60 minutes    Therapy Diagnosis:   Encounter Diagnosis   Name Primary?    Lymphedema of right arm Yes     Physician: Shiloh Tao MD  Physician Orders: OT Eval and Treat    Evaluation Date: 2/28/2023  Insurance Authorization Period Expiration: 12/31/2023  Plan of Care Expiration: 8/14/2023  Visit # / Visits [id number]/ 28    Precautions:  Standard and cancer    SUBJECTIVE     Pt reports: She  is using the compression pump every day except when she comes to therapy.  Patient reports decreased ability to walk due to R knee and L hip pain  Response to previous treatment: the RUE is notably softer throughout. The pinkness in the arm is no longer present in the anterior arm. There is a 2 inch band of pinkness along the lateral upper and forearm.    Functional change: n/a    Pain: 7/10  Location: the back, neck, legs    OBJECTIVE     Pt arrived wearing nothing on the arm. The arm appears similarly pink as compared to last session and not warm.  Objective Measures updated at progress report unless specified.    Treatment     Tiara received the treatments listed below:     Manual therapy techniques were applied for 60 minutes, including:    MANUAL LYMPHATIC DRAINAGE (MLD):    While supine with LEs elevated  over wedge, stimulation performed about the lymph nodes of the head and neck.  The entire R upper quadrant received drainage. The anterior chest wall was shunted across towards the L axilla. The  arm is full of soft pitting edema. There is persistent  pinkness about the lateral arm/forearm. The upper arm was treated with scooping and pumping and deep thumb [place] techniques. The cubital fossa was cleared. The forearm was treated with deep thumb [place] techniques to break down the pitting edema, as was the dorsal  hand. The digits were cleared then the entire arm was shunted up through the posterior upper arm. Patient turned to sidelying and fluid was shunted across the back towards the L axilla.     MULTILAYERED BANDAGING: bandaged R UE with cotton stockinette, 1-6cm, 1-8cm and - 10cm  to leave intact 12-24 hrs as tolerated, discontinue with any problems, return rolled bandages next session. Wash and wear schedules confirmed.     Patient was encouraged to continue using her home sequential compression pump daily    Pt was educated in potential compression needs.       Discussed wear schedule, don/doff, wash and management of products.    Patient Education and Home Exercises      Education provided:   1. Educated on cause of lymphedema.  2. Explained the Complete Decongestive Therapy protocol in depth  3. Educated on Phase 1 and 2 of protocol.  4. Reviewed treatment frequency and likely duration of weeks  5. Precautions and contraindications for treatment.  6. Plan of care and goals.  7. Educated on home management protocols.     Written Home Compression program Provided: Patient to have the arm in short stretch compression bandages or compression sleeve daily.         Assessment        Tiara Rasheed is a 72 y.o. female referred to outpatient occupational therapy and presents with a medical diagnosis of lymphedema secondary to breast cancer. Lymphedema, left untreated increases risk of infection, and poor body image. Patient presents with the following therapy deficits: RUE lymphedema .Following medical record review it is determined that pt will benefit from complete decongestive therapy services for the treatment and management of this chronic condition. The following goals were discussed with the patient and patient is in agreement with them as to be addressed in the treatment plan. The patient's rehab potential is Good.   Pt would continue to benefit from skilled OT. yes     Tiara is progressing well towards her  goals and there are no updates to goals at this time. Pt prognosis is Good.     Pt will continue to benefit from skilled outpatient occupational therapy to address the deficits listed in the problem list on initial evaluation provide pt/family education and to maximize pt's level of independence in the home and community environment. Plan of care is extended to continue treatment once per week for 8 weeks thru 8/14/23.    Pt's spiritual, cultural and educational needs considered and pt agreeable to plan of care and goals.    Anticipated barriers to occupational therapy: duration of lymphedema and size of the arm    Goals:   Short Term Goals for 3 weeks: (phase 1 of protocol)  1. Patient and/or caregiver will demonstrate understanding of lymphedema precautions to decrease the risk of infection and lymphedema exacerbation. - MET 6/1/2023   2. Patient will tolerate daily activities wearing multilayered bandaging.- Ongoing 7/25/2023   3. Patient and/or caregiver will order/obtain appropriate compression garments- Ongoing 7/25/2023  4. Patient will experience a decrease in girth of affected areas of 1.0 cm- Partially met 7/6/2023      Long Term Goals for 6 weeks: (Phase 2 of goals)  1. Patient and/or caregiver will be independent with donning and doffing of compression garments.  2. Patient and/or caregiver will be independent in self-bandaging and self MLD techniques.  3. Patient and/or caregiver will be independent with HEP and lymphedema management to help prevent edema relapse and reduce risk of infection.  4. Patient will experience a decreased in girth of affected areas of 2.0 cm     PLAN     Continue plan of care  1 time weekly for 8 additional  weeks 6/20/23 thru 8/14/23 to include the following interventions: Edema Control.    Ute Yeung OT, SOFIA

## 2023-08-08 ENCOUNTER — CLINICAL SUPPORT (OUTPATIENT)
Dept: REHABILITATION | Facility: HOSPITAL | Age: 73
End: 2023-08-08
Payer: MEDICARE

## 2023-08-08 DIAGNOSIS — I89.0 LYMPHEDEMA OF RIGHT ARM: Primary | ICD-10-CM

## 2023-08-08 PROCEDURE — 97140 MANUAL THERAPY 1/> REGIONS: CPT

## 2023-08-08 NOTE — PROGRESS NOTES
[facility] OUTPATIENT THERAPY AND WELLNESS  Occupational Therapy Treatment Note    Date: 8/8/2023  Name: Tiara Rasheed  Clinic Number: [id number]    Time In: 10:00  Time Out: 11:00  Total Billable Time: 60 minutes    Therapy Diagnosis:   Encounter Diagnosis   Name Primary?    Lymphedema of right arm Yes     Physician: Shiloh Tao MD  Physician Orders: OT Eval and Treat    Evaluation Date: 2/28/2023  Insurance Authorization Period Expiration: 12/31/2023  Plan of Care Expiration: 9/28//2023  Visit # / Visits authorized 21/ 28    Precautions:  Standard and cancer    SUBJECTIVE     Pt reports: She  is using the compression pump every day except when she comes to therapy.  Patient reports decreased ability to walk due to R knee and L hip pain  Response to previous treatment: the RUE is notably softer throughout. The pinkness in the arm is notably increased in area as well as brightness throughout the arm this visit.  It is not painful or warm.  Functional change: n/a    Pain: 7/10  Location: the back, neck, legs    OBJECTIVE     Pt arrived with no compression on the arm.   Objective Measures updated at progress report unless specified.    Treatment     Tiara received the treatments listed below:     Manual therapy techniques were applied for 60 minutes, including:    MANUAL LYMPHATIC DRAINAGE (MLD):    While supine with LEs elevated  over wedge, stimulation performed about the lymph nodes of the head and neck.  The entire R upper quadrant received drainage. The anterior chest wall was shunted across towards the L axilla. The  arm is full of soft pitting edema. There is increased pinkness about the upper arm, lateral arm/forearm. The upper arm was treated with scooping and pumping and deep thumb [place] techniques. The cubital fossa was cleared. The forearm was treated with deep thumb [place] techniques to break down the pitting edema, as was the dorsal hand. The digits were cleared then the entire arm was shunted up  through the posterior upper arm. Patient turned to sidelying and fluid was shunted across the back towards the L axilla. The pinkness in the arm dissipates following Manual lymphatic drainage and at the end of session is much lighter in color.    MULTILAYERED BANDAGING: bandaged R UE with cotton stockinette, 1-6cm, 1-8cm and - 10cm  to leave intact 12-24 hrs as tolerated, discontinue with any problems, return rolled bandages next session. Wash and wear schedules confirmed.     Patient was encouraged to continue using her home sequential compression pump daily    Pt was educated in potential compression needs.       Discussed wear schedule, don/doff, wash and management of products.    Patient Education and Home Exercises      Education provided:   1. Educated on cause of lymphedema.  2. Explained the Complete Decongestive Therapy protocol in depth  3. Educated on Phase 1 and 2 of protocol.  4. Reviewed treatment frequency and likely duration of weeks  5. Precautions and contraindications for treatment.  6. Plan of care and goals.  7. Educated on home management protocols.     Written Home Compression program Provided: Patient to have the arm in short stretch compression bandages or compression sleeve daily.         Assessment        Tiara Rasheed is a 72 y.o. female referred to outpatient occupational therapy and presents with a medical diagnosis of lymphedema secondary to breast cancer. Lymphedema, left untreated increases risk of infection, and poor body image. Patient presents with the following therapy deficits: RUE lymphedema .Following medical record review it is determined that pt will benefit from complete decongestive therapy services for the treatment and management of this chronic condition. The following goals were discussed with the patient and patient is in agreement with them as to be addressed in the treatment plan. The patient's rehab potential is Good.   Pt would continue to benefit from skilled  OT. yes     Tiara is progressing well towards her goals and there are no updates to goals at this time. Pt prognosis is Good.     Pt will continue to benefit from skilled outpatient occupational therapy to address the deficits listed in the problem list on initial evaluation provide pt/family education and to maximize pt's level of independence in the home and community environment. Plan of care is extended to continue treatment once per week for 8 weeks thru 9/28/23.    Pt's spiritual, cultural and educational needs considered and pt agreeable to plan of care and goals.    Anticipated barriers to occupational therapy: duration of lymphedema and size of the arm    Goals:   Short Term Goals for 3 weeks: (phase 1 of protocol)  1. Patient and/or caregiver will demonstrate understanding of lymphedema precautions to decrease the risk of infection and lymphedema exacerbation. - MET 6/1/2023   2. Patient will tolerate daily activities wearing multilayered bandaging.- Ongoing 8/8//2023   3. Patient and/or caregiver will order/obtain appropriate compression garments- Ongoing 8/8/2023  4. Patient will experience a decrease in girth of affected areas of 1.0 cm- Partially met 7/6/2023      Long Term Goals for 6 weeks: (Phase 2 of goals)  1. Patient and/or caregiver will be independent with donning and doffing of compression garments.  2. Patient and/or caregiver will be independent in self-bandaging and self MLD techniques.  3. Patient and/or caregiver will be independent with HEP and lymphedema management to help prevent edema relapse and reduce risk of infection.  4. Patient will experience a decreased in girth of affected areas of 2.0 cm     PLAN     Updated plan of care  8/8/2023 Continue 1 time weekly for 8 additional  weeks 8/8/23 thru 9/28/23 to include the following interventions: Edema Control.    Ute Yeung OT, SOFIA

## 2023-08-17 ENCOUNTER — CLINICAL SUPPORT (OUTPATIENT)
Dept: REHABILITATION | Facility: HOSPITAL | Age: 73
End: 2023-08-17
Payer: MEDICARE

## 2023-08-17 DIAGNOSIS — I89.0 LYMPHEDEMA OF RIGHT ARM: Primary | ICD-10-CM

## 2023-08-17 PROCEDURE — 97140 MANUAL THERAPY 1/> REGIONS: CPT

## 2023-08-17 NOTE — PROGRESS NOTES
[facility]  Occupational Therapy Treatment Note    Date: 8/17/2023  Name: Tiara Rasheed  Clinic Number: [id number]    Time In: 10:00  Time Out: 11:00  Total Billable Time: 60 minutes    Therapy Diagnosis:   Encounter Diagnosis   Name Primary?    Lymphedema of right arm Yes     Physician: Shiloh Tao MD  Physician Orders: OT Eval and Treat    Evaluation Date: 2/28/2023  Insurance Authorization Period Expiration: 12/31/2023  Plan of Care Expiration: 9/28//2023  Visit # / Visits authorized 22/ 28    Precautions:  Standard and cancer    SUBJECTIVE     Pt reports: She did not use her compression pump last night after a rough day at the eye dr.  Response to previous treatment: the RUE is notably softer throughout. The pinkness in the arm is notably increased in area as well as brightness throughout the arm this visit.  It is not painful or warm.  Functional change: n/a    Pain: 7/10  Location: the back, neck, legs    OBJECTIVE     Pt arrived with no compression on the arm.   Objective Measures updated at progress report unless specified.    Treatment     Tiara received the treatments listed below:     Manual therapy techniques were applied for 60 minutes, including:    MANUAL LYMPHATIC DRAINAGE (MLD):    While supine with LEs elevated  over wedge, stimulation performed about the lymph nodes of the head and neck.  The entire R upper quadrant received drainage. The anterior chest wall was shunted across towards the L axilla. The  arm is full of soft pitting edema. There is continued pinkness about the upper arm, lateral arm/forearm. The upper arm was treated with scooping and pumping and deep thumb [place] techniques. The cubital fossa was cleared. The forearm was treated with deep thumb [place] techniques to break down the pitting edema, as was the dorsal hand. The digits were cleared then the entire arm was shunted up through the posterior upper arm. Patient turned to sidelying and  fluid was shunted across the back towards the L axilla. The pinkness in the arm dissipates following Manual lymphatic drainage and at the end of session is much lighter in color.    MULTILAYERED BANDAGING: bandaged R UE with cotton stockinette, 1-6cm, 1-8cm and - 10cm  to leave intact 12-24 hrs as tolerated, discontinue with any problems, return rolled bandages next session. Wash and wear schedules confirmed.     Patient was encouraged to continue using her home sequential compression pump daily    Pt was educated in potential compression needs.       Discussed wear schedule, don/doff, wash and management of products.    Patient Education and Home Exercises      Education provided:   1. Educated on cause of lymphedema.  2. Explained the Complete Decongestive Therapy protocol in depth  3. Educated on Phase 1 and 2 of protocol.  4. Reviewed treatment frequency and likely duration of weeks  5. Precautions and contraindications for treatment.  6. Plan of care and goals.  7. Educated on home management protocols.     Written Home Compression program Provided: Patient to have the arm in short stretch compression bandages or compression sleeve daily.         Assessment        Tiara Rasheed is a 72 y.o. female referred to outpatient occupational therapy and presents with a medical diagnosis of lymphedema secondary to breast cancer. Lymphedema, left untreated increases risk of infection, and poor body image. Patient presents with the following therapy deficits: RUE lymphedema .Following medical record review it is determined that pt will benefit from complete decongestive therapy services for the treatment and management of this chronic condition. The following goals were discussed with the patient and patient is in agreement with them as to be addressed in the treatment plan. The patient's rehab potential is Good.   Pt would continue to benefit from skilled OT. yes     Tiara is progressing well towards her goals and  there are no updates to goals at this time. Pt prognosis is Good.     Pt will continue to benefit from skilled outpatient occupational therapy to address the deficits listed in the problem list on initial evaluation provide pt/family education and to maximize pt's level of independence in the home and community environment. Plan of care is extended to continue treatment once per week for 8 weeks thru 9/28/23.    Pt's spiritual, cultural and educational needs considered and pt agreeable to plan of care and goals.    Anticipated barriers to occupational therapy: duration of lymphedema and size of the arm    Goals:   Short Term Goals for 3 weeks: (phase 1 of protocol)  1. Patient and/or caregiver will demonstrate understanding of lymphedema precautions to decrease the risk of infection and lymphedema exacerbation. - MET 6/1/2023   2. Patient will tolerate daily activities wearing multilayered bandaging.- Ongoing 8/17/2023   3. Patient and/or caregiver will order/obtain appropriate compression garments- Ongoing 8/17/2023  4. Patient will experience a decrease in girth of affected areas of 1.0 cm- Partially met 7/6/2023      Long Term Goals for 6 weeks: (Phase 2 of goals)  1. Patient and/or caregiver will be independent with donning and doffing of compression garments.  2. Patient and/or caregiver will be independent in self-bandaging and self MLD techniques.  3. Patient and/or caregiver will be independent with HEP and lymphedema management to help prevent edema relapse and reduce risk of infection.  4. Patient will experience a decreased in girth of affected areas of 2.0 cm     PLAN     Updated plan of care  8/8/2023 Continue 1 time weekly for 8 additional  weeks 8/8/23 thru 9/28/23 to include the following interventions: Edema Control.    Ute Yeung OT, SOFIA

## 2023-08-22 ENCOUNTER — CLINICAL SUPPORT (OUTPATIENT)
Dept: REHABILITATION | Facility: HOSPITAL | Age: 73
End: 2023-08-22
Payer: MEDICARE

## 2023-08-22 DIAGNOSIS — I89.0 LYMPHEDEMA OF RIGHT ARM: Primary | ICD-10-CM

## 2023-08-22 PROCEDURE — 97140 MANUAL THERAPY 1/> REGIONS: CPT

## 2023-08-22 NOTE — PROGRESS NOTES
[facility] OUTPATIENT THERAPY AND WELLNESS  Occupational Therapy Treatment Note    Date: 8/22/2023  Name: Tiara Rasheed  Clinic Number: [id number]    Time In: 11:00  Time Out: 12:00  Total Billable Time: 60 minutes    Therapy Diagnosis:   Encounter Diagnosis   Name Primary?    Lymphedema of right arm Yes     Physician: Shiloh Tao MD  Physician Orders: OT Eval and Treat    Evaluation Date: 2/28/2023  Insurance Authorization Period Expiration: 12/31/2023  Plan of Care Expiration: 9/28//2023  Visit # / Visits authorized 23/ 28    Precautions:  Standard and cancer    SUBJECTIVE     Pt reports: She used her compression pump last night   Response to previous treatment: the RUE is notably softer throughout. The pinkness in the arm is notably increased in area as well as brightness throughout the arm this visit.  It is not painful or warm.  Functional change: n/a    Pain: 7/10  Location: the back, neck, legs    OBJECTIVE     Pt arrived with no compression on the arm.   Objective Measures updated at progress report unless specified.    Treatment     Tiara received the treatments listed below:     Manual therapy techniques were applied for 60 minutes, including:    MANUAL LYMPHATIC DRAINAGE (MLD):    While supine with LEs elevated  over wedge, stimulation performed about the lymph nodes of the head and neck.  The entire R upper quadrant received drainage. The anterior chest wall was shunted across towards the L axilla. The  arm is full of soft pitting edema. There is continued pinkness about the upper arm, lateral arm/forearm. The upper arm was treated with scooping and pumping and deep thumb [place] techniques. The cubital fossa was cleared. The forearm was treated with deep thumb [place] and [place] techniques to break down the pitting edema, as was the dorsal hand. The digits were cleared then the entire arm was shunted up through the posterior upper arm. Patient turned to sidelying and fluid was shunted across the back towards  the L axilla. The pinkness in the arm dissipates following Manual lymphatic drainage and at the end of session is much lighter in color.    MULTILAYERED Bandaging: Patient was bandaged R UE with cotton stockinette, 1-6cm, 1-8cm and - 10cm  to leave intact 12-24 hrs as tolerated, discontinue with any problems, return rolled bandages next session. Wash and wear schedules confirmed.     Patient was encouraged to continue using her home sequential compression pump daily    Pt was educated in potential compression needs.       Discussed wear schedule, don/doff, wash and management of products.    Patient Education and Home Exercises      Education provided:   1. Educated on cause of lymphedema.  2. Explained the Complete Decongestive Therapy protocol in depth  3. Educated on Phase 1 and 2 of protocol.  4. Reviewed treatment frequency and likely duration of weeks  5. Precautions and contraindications for treatment.  6. Plan of care and goals.  7. Educated on home management protocols.     Written Home Compression program Provided: Patient to have the arm in short stretch compression bandages or compression sleeve daily.         Assessment        Tiara Rasheed is a 72 y.o. female referred to outpatient occupational therapy and presents with a medical diagnosis of lymphedema secondary to breast cancer. Lymphedema, left untreated increases risk of infection, and poor body image. Patient presents with the following therapy deficits: RUE lymphedema .Following medical record review it is determined that pt will benefit from complete decongestive therapy services for the treatment and management of this chronic condition. The following goals were discussed with the patient and patient is in agreement with them as to be addressed in the treatment plan. The patient's rehab potential is Good.   Pt would continue to benefit from skilled OT. yes     Tiara is progressing well towards her goals and there are no updates to goals  at this time. Pt prognosis is Good.     Pt will continue to benefit from skilled outpatient occupational therapy to address the deficits listed in the problem list on initial evaluation provide pt/family education and to maximize pt's level of independence in the home and community environment. Plan of care is extended to continue treatment once per week for 8 weeks thru 9/28/23.    Pt's spiritual, cultural and educational needs considered and pt agreeable to plan of care and goals.    Anticipated barriers to occupational therapy: duration of lymphedema and size of the arm    Goals:   Short Term Goals for 3 weeks: (phase 1 of protocol)  1. Patient and/or caregiver will demonstrate understanding of lymphedema precautions to decrease the risk of infection and lymphedema exacerbation. - MET 6/1/2023   2. Patient will tolerate daily activities wearing multilayered bandaging.- Ongoing 8/22/2023   3. Patient and/or caregiver will order/obtain appropriate compression garments- Ongoing 8/22/2023  4. Patient will experience a decrease in girth of affected areas of 1.0 cm- Partially met 7/6/2023      Long Term Goals for 6 weeks: (Phase 2 of goals)  1. Patient and/or caregiver will be independent with donning and doffing of compression garments.  2. Patient and/or caregiver will be independent in self-bandaging and self MLD techniques.  3. Patient and/or caregiver will be independent with HEP and lymphedema management to help prevent edema relapse and reduce risk of infection.  4. Patient will experience a decreased in girth of affected areas of 2.0 cm     PLAN     Updated plan of care  8/8/2023 Continue 1 time weekly for 8 additional  weeks 8/8/23 thru 9/28/23 to include the following interventions: Edema Control.    Ute Yeung OT, SOFIA

## 2023-08-31 ENCOUNTER — CLINICAL SUPPORT (OUTPATIENT)
Dept: REHABILITATION | Facility: HOSPITAL | Age: 73
End: 2023-08-31
Payer: MEDICARE

## 2023-08-31 DIAGNOSIS — I89.0 LYMPHEDEMA OF RIGHT ARM: Primary | ICD-10-CM

## 2023-08-31 PROCEDURE — 97140 MANUAL THERAPY 1/> REGIONS: CPT

## 2023-08-31 NOTE — PROGRESS NOTES
[facility]  Occupational Therapy Treatment Note    Date: 8/31/2023  Name: Tiara Rasheed  Clinic Number: [id number]    Time In: 2:10  Time Out: 3:10  Total Billable Time: 60 minutes    Therapy Diagnosis:   Encounter Diagnosis   Name Primary?    Lymphedema of right arm Yes     Physician: Shiloh Tao MD  Physician Orders: OT Eval and Treat    Evaluation Date: 2/28/2023  Insurance Authorization Period Expiration: 12/31/2023  Plan of Care Expiration: 9/28//2023  Visit # / Visits authorized 24/ 28    Precautions:  Standard and cancer    SUBJECTIVE     Pt reports: She used her compression pump last night   Response to previous treatment: the RUE is notably softer throughout. The pinkness in the RUE remains.   It is not painful or warm.  Functional change: n/a    Pain: 7/10  Location: the back, neck, legs    OBJECTIVE     Pt arrived with no compression on the arm.     Girth measurements were taken  last session when the arm was quite pink and compared to 2/28/23 are as follows:  Pt arrived wearing nothing on the arm. The arm appears notably less pink as compared to last session and not warm.  UE Girth Measurements:  [place] RIGHT UE  2/28/23 RIGHT UE  8/22/23 change             E + 15cm 36.0 cm 35.0 cm -1.0 cm   E + 10cm 39.0 cm 38.0 cm -1.0cm   Elbow 35.5 cm 32.5 cm -3.0 cm             W + 15cm 36.0 cm 34.0 cm -2.0 cm   W + 10cm 31.0 cm 31.0 cm 0.0 cm   Wrist 20.0 cm 19.5 cm -0.5 cm   DPC 19.5 cm 18.8 cm -0.7 cm   IP 6.0 cm 6.0 cm -0.0cm         Objective Measures updated at progress report unless specified.    Objective Measures updated at progress report unless specified.    Treatment     Tiara received the treatments listed below:     Manual therapy techniques were applied for 60 minutes, including:    MANUAL LYMPHATIC DRAINAGE (MLD):    While supine with LEs elevated  over wedge, stimulation performed about the lymph nodes of the head and neck.  The entire R upper quadrant  received drainage. The anterior chest wall was shunted across towards the L axilla. The  arm is full of soft pitting edema. There is continued pinkness about the upper arm, lateral arm/forearm. The upper arm was treated with scooping and pumping and deep thumb [place] techniques. The cubital fossa was cleared. The forearm was treated with deep thumb [place] techniques to break down the pitting edema, as was the dorsal hand. The digits were cleared then the entire arm was shunted up through the posterior upper arm. Patient turned to sidelying and fluid was shunted across the back towards the L axilla. The pinkness in the arm dissipates following Manual lymphatic drainage and at the end of session is much lighter in color.    MULTILAYERED Bandaging: Patient was bandaged R UE with cotton stockinette, 1-6cm, 1-8cm and - 10cm  to leave intact 12-24 hrs as tolerated, discontinue with any problems, return rolled bandages next session. Wash and wear schedules confirmed.     Patient was encouraged to continue using her home sequential compression pump daily    Pt was educated in potential compression needs.       Discussed wear schedule, don/doff, wash and management of products.    Patient Education and Home Exercises      Education provided:   1. Educated on cause of lymphedema.  2. Explained the Complete Decongestive Therapy protocol in depth  3. Educated on Phase 1 and 2 of protocol.  4. Reviewed treatment frequency and likely duration of weeks  5. Precautions and contraindications for treatment.  6. Plan of care and goals.  7. Educated on home management protocols.     Written Home Compression program Provided: Patient to have the arm in short stretch compression bandages or compression sleeve daily.         Assessment        Tiara Rasheed is a 72 y.o. female referred to outpatient occupational therapy and presents with a medical diagnosis of lymphedema secondary to breast cancer. Lymphedema, left untreated increases  risk of infection, and poor body image. Patient presents with the following therapy deficits: RUE lymphedema .Following medical record review it is determined that pt will benefit from complete decongestive therapy services for the treatment and management of this chronic condition. The following goals were discussed with the patient and patient is in agreement with them as to be addressed in the treatment plan. The patient's rehab potential is Good.   Pt would continue to benefit from skilled OT. yes     Tiara is progressing well towards her goals and there are no updates to goals at this time. Pt prognosis is Good.     Pt will continue to benefit from skilled outpatient occupational therapy to address the deficits listed in the problem list on initial evaluation provide pt/family education and to maximize pt's level of independence in the home and community environment. Plan of care is extended to continue treatment once per week for 8 weeks thru 9/28/23.    Pt's spiritual, cultural and educational needs considered and pt agreeable to plan of care and goals.    Anticipated barriers to occupational therapy: duration of lymphedema and size of the arm    Goals:   Short Term Goals for 3 weeks: (phase 1 of protocol)  1. Patient and/or caregiver will demonstrate understanding of lymphedema precautions to decrease the risk of infection and lymphedema exacerbation. - MET 6/1/2023   2. Patient will tolerate daily activities wearing multilayered bandaging.- Ongoing 8/22/2023   3. Patient and/or caregiver will order/obtain appropriate compression garments- Ongoing 8/22/2023  4. Patient will experience a decrease in girth of affected areas of 1.0 cm- Partially met 7/6/2023      Long Term Goals for 6 weeks: (Phase 2 of goals)  1. Patient and/or caregiver will be independent with donning and doffing of compression garments.  2. Patient and/or caregiver will be independent in self-bandaging and self MLD techniques.  3.  Patient and/or caregiver will be independent with HEP and lymphedema management to help prevent edema relapse and reduce risk of infection.  4. Patient will experience a decreased in girth of affected areas of 2.0 cm     PLAN     Updated plan of care  8/8/2023 Continue 1 time weekly for 8 additional  weeks 8/8/23 thru 9/28/23 to include the following interventions: Edema Control.    Ute Yeung OT, SOFIA

## 2023-09-07 ENCOUNTER — CLINICAL SUPPORT (OUTPATIENT)
Dept: REHABILITATION | Facility: HOSPITAL | Age: 73
End: 2023-09-07
Payer: MEDICARE

## 2023-09-07 DIAGNOSIS — I89.0 LYMPHEDEMA OF RIGHT ARM: Primary | ICD-10-CM

## 2023-09-07 PROCEDURE — 97140 MANUAL THERAPY 1/> REGIONS: CPT

## 2023-09-07 NOTE — PROGRESS NOTES
[facility] OUTPATIENT THERAPY AND [facility]LL[facility]  Occupational Therapy Treatment Note    Date: 9/7/2023  Name: Tiara Rasheed  Clinic Number: [id number]    Time In: 2:00  Time Out: 3:00  Total Billable Time: 60 minutes    Therapy Diagnosis:   Encounter Diagnosis   Name Primary?    Lymphedema of right arm Yes     Physician: Shiloh Tao MD  Physician Orders: OT Eval and Treat    Evaluation Date: 2/28/2023  Insurance Authorization Period Expiration: 12/31/2023  Plan of Care Expiration: 9/28//2023  Visit # / Visits authorized 25/ 28    Precautions:  Standard and cancer    SUBJECTIVE     Pt reports: She  her d not use the compression pump last night   Response to previous treatment: the RUE is notably softer throughout. The pinkness in the RUE remains.   It is not painful or warm.  Functional change: n/a    Pain: 7/10  Location: the back, neck, legs    OBJECTIVE     Pt arrived with no compression on the arm.       Pt arrived wearing nothing on the arm. The arm appears slightly more pink as compared to last session however is not warm.      Objective Measures updated at progress report unless specified.    Treatment     Tiara received the treatments listed below:     Manual therapy techniques were applied for 60 minutes, including:    MANUAL LYMPHATIC DRAINAGE (MLD):    While supine with LEs elevated  over wedge, stimulation performed about the lymph nodes of the head and neck.  The entire R upper quadrant received drainage. The anterior chest wall was shunted across towards the L axilla. The  arm is full of soft pitting edema. There is continued pinkness about the upper arm, lateral arm/forearm. The upper arm was treated with scooping and pumping and deep thumb [place] techniques. The cubital fossa was cleared. The forearm was treated with deep thumb [place] techniques to break down the pitting edema, as was the dorsal hand. The digits were cleared then the entire arm was shunted up through the posterior upper arm. Patient  turned to sidelying and fluid was shunted across the back towards the L axilla. The pinkness in the arm dissipates following Manual lymphatic drainage and at the end of session is somewhat lighter in color.    MULTILAYERED Bandaging: Patient was bandaged R UE with cotton stockinette, 1-6cm, 1-8cm and - 10cm  to leave intact 12-24 hrs as tolerated, discontinue with any problems, return rolled bandages next session. Wash and wear schedules confirmed.     Patient was encouraged to continue using her home sequential compression pump daily    Pt was educated in potential compression needs.       Discussed wear schedule, don/doff, wash and management of products.    Patient Education and Home Exercises      Education provided:   1. Educated on cause of lymphedema.  2. Explained the Complete Decongestive Therapy protocol in depth  3. Educated on Phase 1 and 2 of protocol.  4. Reviewed treatment frequency and likely duration of weeks  5. Precautions and contraindications for treatment.  6. Plan of care and goals.  7. Educated on home management protocols.     Written Home Compression program Provided: Patient to have the arm in short stretch compression bandages or compression sleeve daily.         Assessment        Tiara Rasheed is a 72 y.o. female referred to outpatient occupational therapy and presents with a medical diagnosis of lymphedema secondary to breast cancer. Lymphedema, left untreated increases risk of infection, and poor body image. Patient presents with the following therapy deficits: RUE lymphedema .Following medical record review it is determined that pt will benefit from complete decongestive therapy services for the treatment and management of this chronic condition. The following goals were discussed with the patient and patient is in agreement with them as to be addressed in the treatment plan. The patient's rehab potential is Good.   Pt would continue to benefit from skilled OT. yes     Tiara is  progressing well towards her goals and there are no updates to goals at this time. Pt prognosis is Good.     Pt will continue to benefit from skilled outpatient occupational therapy to address the deficits listed in the problem list on initial evaluation provide pt/family education and to maximize pt's level of independence in the home and community environment. Plan of care is extended to continue treatment once per week for 8 weeks thru 9/28/23.    Pt's spiritual, cultural and educational needs considered and pt agreeable to plan of care and goals.    Anticipated barriers to occupational therapy: duration of lymphedema and size of the arm    Goals:   Short Term Goals for 3 weeks: (phase 1 of protocol)  1. Patient and/or caregiver will demonstrate understanding of lymphedema precautions to decrease the risk of infection and lymphedema exacerbation. - MET 6/1/2023   2. Patient will tolerate daily activities wearing multilayered bandaging.- Ongoing 9/7/2023   3. Patient and/or caregiver will order/obtain appropriate compression garments- Ongoing 9/7/2023  4. Patient will experience a decrease in girth of affected areas of 1.0 cm- Partially met 7/6/2023      Long Term Goals for 6 weeks: (Phase 2 of goals)  1. Patient and/or caregiver will be independent with donning and doffing of compression garments.  2. Patient and/or caregiver will be independent in self-bandaging and self MLD techniques.  3. Patient and/or caregiver will be independent with HEP and lymphedema management to help prevent edema relapse and reduce risk of infection.  4. Patient will experience a decreased in girth of affected areas of 2.0 cm     PLAN     Updated plan of care  8/8/2023 Continue 1 time weekly for 8 additional  weeks 8/8/23 thru 9/28/23 to include the following interventions: Edema Control.    Ute Yeung OT, SOFIA

## 2023-09-14 ENCOUNTER — CLINICAL SUPPORT (OUTPATIENT)
Dept: REHABILITATION | Facility: HOSPITAL | Age: 73
End: 2023-09-14
Payer: MEDICARE

## 2023-09-14 DIAGNOSIS — I89.0 LYMPHEDEMA OF RIGHT ARM: Primary | ICD-10-CM

## 2023-09-14 PROCEDURE — 97140 MANUAL THERAPY 1/> REGIONS: CPT

## 2023-09-14 NOTE — PROGRESS NOTES
[facility]  Occupational Therapy Treatment Note    Date: 9/14/2023  Name: Tiara Rasheed  Clinic Number: [id number]    Time In: 10:00  Time Out: 11:00  Total Billable Time: 60 minutes    Therapy Diagnosis:   Encounter Diagnosis   Name Primary?    Lymphedema of right arm Yes     Physician: Shiloh Tao MD  Physician Orders: OT Eval and Treat    Evaluation Date: 2/28/2023  Insurance Authorization Period Expiration: 12/31/2023  Plan of Care Expiration: 9/28//2023  Visit # / Visits authorized 26/ 28    Precautions:  Standard and cancer    SUBJECTIVE     Pt reports: She did not use the compression pump last night   Response to previous treatment: the RUE is notably softer throughout. The pinkness in the RUE remains.   It is not painful or warm.  Functional change: n/a    Pain: 7/10  Location: the back, neck, legs    OBJECTIVE     Pt arrived with no compression on the arm.       UE Girth Measurements:  LANDMARK RIGHT UE  2/28/23 RIGHT UE  9/14/23 change             E + 15cm 36.0 cm 35.0.5 cm -1.5 cm   E + 10cm 39.0 cm 36.5 cm -2.5cm   Elbow 35.5 cm 33.5 cm -2.0 cm             W + 15cm 36.0 cm 34.0 cm -2.0 cm   W + 10cm 31.0 cm 30.0 cm -1.0 cm   Wrist 20.0 cm 19.5 cm -0.5 cm   DPC 19.5 cm 18.0 cm -1.5 cm   IP 6.0 cm 6.0 cm -0.0cm           Objective Measures updated at progress report unless specified.    Treatment     Tiara received the treatments listed below:     Manual therapy techniques were applied for 60 minutes, including:    MANUAL LYMPHATIC DRAINAGE (MLD):    While supine with LEs elevated  over wedge, stimulation performed about the lymph nodes of the head and neck.  The entire R upper quadrant received drainage. The anterior chest wall was shunted across towards the L axilla. The  arm is full of soft pitting edema. There is continued pinkness about the upper arm, lateral arm/forearm. The upper arm was treated with scooping and pumping and deep thumb [place] techniques. The  cubital fossa was cleared. The forearm was treated with deep thumb [place] techniques to break down the pitting edema, as was the dorsal hand. The digits were cleared then the entire arm was shunted up through the posterior upper arm. Patient turned to sidelying and fluid was shunted across the back towards the L axilla. The pinkness in the arm dissipates following Manual lymphatic drainage and at the end of session is somewhat lighter in color.    MULTILAYERED Bandaging: Patient was bandaged R UE with cotton stockinette, 1-6cm, 1-8cm and - 10cm  to leave intact 12-24 hrs as tolerated, discontinue with any problems, return rolled bandages next session. Wash and wear schedules confirmed.     Patient was encouraged to continue using her home sequential compression pump daily    Pt was educated in potential compression needs.       Discussed wear schedule, don/doff, wash and management of products.    Patient Education and Home Exercises      Education provided:   1. Educated on cause of lymphedema.  2. Explained the Complete Decongestive Therapy protocol in depth  3. Educated on Phase 1 and 2 of protocol.  4. Reviewed treatment frequency and likely duration of weeks  5. Precautions and contraindications for treatment.  6. Plan of care and goals.  7. Educated on home management protocols.     Written Home Compression program Provided: Patient to have the arm in short stretch compression bandages or compression sleeve daily.         Assessment        Tiara Rasheed is a 72 y.o. female referred to outpatient occupational therapy and presents with a medical diagnosis of lymphedema secondary to breast cancer. Lymphedema, left untreated increases risk of infection, and poor body image. Patient presents with the following therapy deficits: RUE lymphedema .Following medical record review it is determined that pt will benefit from complete decongestive therapy services for the treatment and management of this chronic  condition. The following goals were discussed with the patient and patient is in agreement with them as to be addressed in the treatment plan. The patient's rehab potential is Good.   Pt would continue to benefit from skilled OT. yes     Tiara is progressing well towards her goals and there are no updates to goals at this time. Pt prognosis is Good.     Pt will continue to benefit from skilled outpatient occupational therapy to address the deficits listed in the problem list on initial evaluation provide pt/family education and to maximize pt's level of independence in the home and community environment. Plan of care is extended to continue treatment once per week for 8 weeks thru 9/28/23.    Pt's spiritual, cultural and educational needs considered and pt agreeable to plan of care and goals.    Anticipated barriers to occupational therapy: duration of lymphedema and size of the arm    Goals:   Short Term Goals for 3 weeks: (phase 1 of protocol)  1. Patient and/or caregiver will demonstrate understanding of lymphedema precautions to decrease the risk of infection and lymphedema exacerbation. - MET 6/1/2023   2. Patient will tolerate daily activities wearing multilayered bandaging.- Ongoing 9/14/2023   3. Patient and/or caregiver will order/obtain appropriate compression garments- Ongoing 9/14/2023  4. Patient will experience a decrease in girth of affected areas of 1.0 cm- Partially met 7/6/2023      Long Term Goals for 6 weeks: (Phase 2 of goals)  1. Patient and/or caregiver will be independent with donning and doffing of compression garments.  2. Patient and/or caregiver will be independent in self-bandaging and self MLD techniques.  3. Patient and/or caregiver will be independent with HEP and lymphedema management to help prevent edema relapse and reduce risk of infection.  4. Patient will experience a decreased in girth of affected areas of 2.0 cm     PLAN     Updated plan of care  8/8/2023 Continue 1 time  weekly for 8 additional  weeks 8/8/23 thru 9/28/23 to include the following interventions: Edema Control.    Ute Yeung OT, MEETAR

## 2023-09-19 ENCOUNTER — CLINICAL SUPPORT (OUTPATIENT)
Dept: REHABILITATION | Facility: HOSPITAL | Age: 73
End: 2023-09-19
Payer: MEDICARE

## 2023-09-19 DIAGNOSIS — I89.0 LYMPHEDEMA OF RIGHT ARM: Primary | ICD-10-CM

## 2023-09-19 PROCEDURE — 97140 MANUAL THERAPY 1/> REGIONS: CPT

## 2023-09-19 NOTE — PROGRESS NOTES
[facility]  Occupational Therapy Treatment Note    Date: 9/19/2023  Name: Tiara Rasheed  Clinic Number: [id number]    Time In: 1:00  Time Out: 2:00  Total Billable Time: 60 minutes    Therapy Diagnosis:   Encounter Diagnosis   Name Primary?    Lymphedema of right arm Yes     Physician: Shiloh Tao MD  Physician Orders: OT Eval and Treat    Evaluation Date: 2/28/2023  Insurance Authorization Period Expiration: 12/31/2023  Plan of Care Expiration: 9/28//2023  Visit # / Visits authorized 27/ 28    Precautions:  Standard and cancer    SUBJECTIVE     Pt reports: She had a follow up visit with Dr. Tao last week and would like to continue the therapy of the RUE as it has been so pink recently. Patient states home management includes occasional use of the compression sleeve and compression pump.  Response to previous treatment: the RUE is notably softer throughout. The pinkness in the RUE remains.   It is not painful or warm.  Functional change: n/a    Pain: 7/10  Location: the back, neck, legs    OBJECTIVE     Pt arrived with no compression on the arm.       Objective Measures updated at progress report unless specified.    Treatment     Tiara received the treatments listed below:     Manual therapy techniques were applied for 60 minutes, including:    MANUAL LYMPHATIC DRAINAGE (MLD):    While supine with LEs elevated  over wedge, stimulation performed about the lymph nodes of the head and neck.  The entire R upper quadrant received drainage. The anterior chest wall was shunted across towards the L axilla. The R  arm is full of soft pitting edema. There is continued pinkness about the upper arm, lateral arm/forearm. The upper arm was treated with scooping and pumping and deep thumb [place] techniques. The cubital fossa was cleared. The forearm was treated with deep thumb [place] techniques to break down the pitting edema, as was the dorsal hand. The digits were cleared then the  entire arm was shunted up through the posterior upper arm. Patient turned to sidelying and fluid was shunted across the back towards the L axilla. The pinkness in the arm dissipates following Manual lymphatic drainage and at the end of session is somewhat lighter in color.    MULTILAYERED Bandaging: Patient was bandaged R UE with cotton stockinette, 1-6cm, 1-8cm and - 10cm  to leave intact 12-24 hrs as tolerated, discontinue with any problems, return rolled bandages next session. Wash and wear schedules confirmed.     Patient was encouraged to continue using her home sequential compression pump daily    Pt was educated in potential compression needs.       Discussed wear schedule, don/doff, wash and management of products.    Patient Education and Home Exercises      Education provided:   1. Educated on cause of lymphedema.  2. Explained the Complete Decongestive Therapy protocol in depth  3. Educated on Phase 1 and 2 of protocol.  4. Reviewed treatment frequency and likely duration of weeks  5. Precautions and contraindications for treatment.  6. Plan of care and goals.  7. Educated on home management protocols.     Written Home Compression program Provided: Patient to have the arm in short stretch compression bandages or compression sleeve daily.         Assessment        Tiara Rasheed is a 72 y.o. female referred to outpatient occupational therapy and presents with a medical diagnosis of lymphedema secondary to breast cancer. Lymphedema, left untreated increases risk of infection, and poor body image. Patient presents with the following therapy deficits: RUE lymphedema .Following medical record review it is determined that pt will benefit from complete decongestive therapy services for the treatment and management of this chronic condition. The following goals were discussed with the patient and patient is in agreement with them as to be addressed in the treatment plan. The patient's rehab potential is Good.    Pt would continue to benefit from skilled OT. yes     Tiara is progressing well towards her goals and there are no updates to goals at this time. Pt prognosis is Good.     Pt will continue to benefit from skilled outpatient occupational therapy to address the deficits listed in the problem list on initial evaluation provide pt/family education and to maximize pt's level of independence in the home and community environment. Plan of care is extended to continue treatment once per week for 8 weeks thru 9/28/23.    Pt's spiritual, cultural and educational needs considered and pt agreeable to plan of care and goals.    Anticipated barriers to occupational therapy: duration of lymphedema and size of the arm    Goals:   Short Term Goals for 3 weeks: (phase 1 of protocol)  1. Patient and/or caregiver will demonstrate understanding of lymphedema precautions to decrease the risk of infection and lymphedema exacerbation. - MET 6/1/2023   2. Patient will tolerate daily activities wearing multilayered bandaging.- Ongoing 9/19/2023   3. Patient and/or caregiver will order/obtain appropriate compression garments- Ongoing 9/19/2023  4. Patient will experience a decrease in girth of affected areas of 1.0 cm- Partially met 7/6/2023      Long Term Goals for 6 weeks: (Phase 2 of goals)  1. Patient and/or caregiver will be independent with donning and doffing of compression garments.  2. Patient and/or caregiver will be independent in self-bandaging and self MLD techniques.  3. Patient and/or caregiver will be independent with HEP and lymphedema management to help prevent edema relapse and reduce risk of infection.  4. Patient will experience a decreased in girth of affected areas of 2.0 cm     PLAN     Updated plan of care  8/8/2023 Continue 1 time weekly for 8 additional  weeks 8/8/23 thru 9/28/23 to include the following interventions: Edema Control.    Ute Yeung, TRISHA, SOFIA

## 2023-09-28 ENCOUNTER — CLINICAL SUPPORT (OUTPATIENT)
Dept: REHABILITATION | Facility: HOSPITAL | Age: 73
End: 2023-09-28
Payer: MEDICARE

## 2023-09-28 DIAGNOSIS — I89.0 LYMPHEDEMA OF RIGHT ARM: Primary | ICD-10-CM

## 2023-09-28 PROCEDURE — 97140 MANUAL THERAPY 1/> REGIONS: CPT

## 2023-09-28 NOTE — PROGRESS NOTES
[facility]  Occupational Therapy Treatment Note    Date: 9/28/2023  Name: Tiara Rasheed  Clinic Number: [id number]    Time In: 1:00  Time Out: 2:00  Total Billable Time: 60 minutes    Therapy Diagnosis:   Encounter Diagnosis   Name Primary?    Lymphedema of right arm Yes     Physician: Shiloh Tao MD  Physician Orders: OT Eval and Treat    Evaluation Date: 2/28/2023  Insurance Authorization Period Expiration: 12/31/2023  Plan of Care Expiration: 9/28//2023 request extension 12/28/23  Visit # / Visits authorized 28/ 28    Precautions:  Standard and cancer    SUBJECTIVE     Pt reports: She had a follow up visit with Dr. Tao last week and would like to continue the therapy of the RUE as it has been so pink recently. Patient states home management includes occasional use of the compression sleeve and compression pump.  Patient reports the arm feels thicker in the upper arm this week and remains very pink.  Response to previous treatment: the RUE is notably softer throughout following treatment.   The pinkness in the RUE remains.   It is not painful or warm.  Functional change: n/a    Pain: 7/10  Location: the back is very painful today    OBJECTIVE     Pt arrived with no compression on the arm. She brought her bandages for use.    UE Girth Measurements:  LANDMARK RIGHT UE  2/28/23 RIGHT UE  9/28//23 change             E + 15cm 36.0 cm 36.5 cm +0.5cm   E + 10cm 39.0 cm 40.0 cm +1.0cm   Elbow 35.5 cm 33.0 cm -2.5 cm             W + 15cm 36.0 cm 35.0 cm -1.0 cm   W + 10cm 31.0 cm 30.5 cm -0.5 cm   Wrist 20.0 cm 19.0 cm -1.0 cm   DPC 19.5 cm 18.5 cm -1.0 cm   IP 6.0 cm 6.0 cm -0.0cm       9/28/23 2/28/23      Objective Measures updated at progress report unless specified.    Treatment     Tiara received the treatments listed below:     Manual therapy techniques were applied for 60 minutes, including:    MANUAL LYMPHATIC DRAINAGE (MLD):    While supine with LEs elevated  over  wedge, stimulation performed about the lymph nodes of the head and neck.  The entire R upper quadrant received drainage. The anterior chest wall was shunted across towards the L axilla. The R  arm is full of soft pitting edema. There is continued pinkness about the upper arm, lateral arm/forearm. ( See photo )The upper arm was treated with scooping and pumping and deep thumb [place] techniques. The cubital fossa was cleared. The forearm was treated with deep thumb [place] techniques to break down the pitting edema, as was the dorsal hand. The digits were cleared then the entire arm was shunted up through the posterior upper arm. Patient turned to sidelying and fluid was shunted across the back towards the L axilla. The pinkness in the arm dissipates following Manual lymphatic drainage and at the end of session is somewhat lighter in color.    MULTILAYERED Bandaging: Patient was bandaged R UE with cotton stockinette, 1-6cm, 1-8cm and - 10cm  to leave intact 12-24 hrs as tolerated, discontinue with any problems, return rolled bandages next session. Wash and wear schedules confirmed.     Patient was encouraged to continue using her home sequential compression pump daily    Pt was educated in potential compression needs.       Discussed wear schedule, don/doff, wash and management of products.    Patient Education and Home Exercises      Education provided:   1. Educated on cause of lymphedema.  2. Explained the Complete Decongestive Therapy protocol in depth  3. Educated on Phase 1 and 2 of protocol.  4. Reviewed treatment frequency and likely duration of weeks  5. Precautions and contraindications for treatment.  6. Plan of care and goals.  7. Educated on home management protocols.     Written Home Compression program Provided: Patient to have the arm in short stretch compression bandages or compression sleeve daily.         Assessment       The pinkness and size of the edema are improved but remain and will benefit  from ongoing manual lymphatic drainage to facilitate lymphatic flow.    Tiara Rasheed is a 72 y.o. female referred to outpatient occupational therapy and presents with a medical diagnosis of lymphedema secondary to breast cancer. Lymphedema, left untreated increases risk of infection, and poor body image. Patient presents with the following therapy deficits: RUE lymphedema .Following medical record review it is determined that pt will benefit from complete decongestive therapy services for the treatment and management of this chronic condition. The following goals were discussed with the patient and patient is in agreement with them as to be addressed in the treatment plan. The patient's rehab potential is Good.   Pt would continue to benefit from skilled OT. yes     Tiara is progressing well towards her goals and there are no updates to goals at this time. Pt prognosis is Good.     Pt will continue to benefit from skilled outpatient occupational therapy to address the deficits listed in the problem list on initial evaluation provide pt/family education and to maximize pt's level of independence in the home and community environment. Plan of care is extended to continue treatment onceevery other week for 8 visits thru 12/28/23.    Pt's spiritual, cultural and educational needs considered and pt agreeable to plan of care and goals.    Anticipated barriers to occupational therapy: duration of lymphedema and size of the arm    Goals:   Short Term Goals for 3 weeks: (phase 1 of protocol)  1. Patient and/or caregiver will demonstrate understanding of lymphedema precautions to decrease the risk of infection and lymphedema exacerbation. - MET 6/1/2023   2. Patient will tolerate daily activities wearing multilayered bandaging.- Ongoing 9/28/2023   3. Patient and/or caregiver will order/obtain appropriate compression garments- Ongoing 9/28/2023  4. Patient will experience a decrease in girth of affected areas of 1.0  cm- Partially met 7/6/2023      Long Term Goals for 6 weeks: (Phase 2 of goals)  1. Patient and/or caregiver will be independent with donning and doffing of compression garments.  2. Patient and/or caregiver will be independent in self-bandaging and self MLD techniques.  3. Patient and/or caregiver will be independent with HEP and lymphedema management to help prevent edema relapse and reduce risk of infection.  4. Patient will experience a decreased in girth of affected areas of 2.0 cm     PLAN     Updated plan of care  9/28/2023 Continue 1 time every other week for 8 additional  usydsh84/2/23 thru12/28/23 to include the following interventions: Edema Control.    Ute Yeung OT, MEETAR

## 2023-10-12 ENCOUNTER — CLINICAL SUPPORT (OUTPATIENT)
Dept: REHABILITATION | Facility: HOSPITAL | Age: 73
End: 2023-10-12
Payer: MEDICARE

## 2023-10-12 DIAGNOSIS — I89.0 LYMPHEDEMA OF RIGHT ARM: Primary | ICD-10-CM

## 2023-10-12 PROCEDURE — 97140 MANUAL THERAPY 1/> REGIONS: CPT

## 2023-10-12 NOTE — PROGRESS NOTES
[facility]  Occupational Therapy Treatment Note    Date: 10/12/2023  Name: Tiara Rasheed  Clinic Number: [id number]    Time In: 2:00  Time Out: 3:00  Total Billable Time: 60 minutes    Therapy Diagnosis:   Encounter Diagnosis   Name Primary?    Lymphedema of right arm Yes     Physician: Shiloh Tao MD  Physician Orders: OT Eval and Treat    Evaluation Date: 2/28/2023  Insurance Authorization Period Expiration: 12/31/2023  Plan of Care Expiration: 9/28//2023 request extension 12/28/23  Visit # / Visits authorized 29/ 40    Precautions:  Standard and cancer    SUBJECTIVE     Pt reports: Patient states home management includes occasional use of the compression sleeve and compression pump.  Patient reports the arm feels softer in the upper arm this week but remains very pink.  Response to previous treatment: the RUE is notably softer throughout following treatment.   The pinkness in the RUE remains.   It is not painful or warm.  Functional change: n/a    Pain: 7/10  Location: the knees are very painful today    OBJECTIVE     Pt arrived with no compression on the arm. She brought her bandages for use.    Patient ambulates with a cane this date.  Objective Measures updated at progress report unless specified.    Treatment     Tiara received the treatments listed below:     Manual therapy techniques were applied for 60 minutes, including:    MANUAL LYMPHATIC DRAINAGE (MLD):    While supine with LEs elevated  over wedge, stimulation performed about the lymph nodes of the head and neck.  The entire R upper quadrant received drainage. The anterior chest wall was shunted across towards the L axilla. The R  arm is full of soft pitting edema. There is continued pinkness about the upper arm, lateral arm/forearm.  The upper arm was treated with scooping and pumping and deep thumb [place] techniques. The cubital fossa was cleared. The forearm was treated with deep thumb [place] techniques to  break down the pitting edema, as was the dorsal hand. The digits were cleared then the entire arm was shunted up through the posterior upper arm. Patient turned to sidelying and fluid was shunted across the back towards the L axilla. The pinkness in the arm dissipates following Manual lymphatic drainage and at the end of session is somewhat lighter in color.    MULTILAYERED Bandaging: Patient was bandaged R UE with cotton stockinette, 1-6cm, 1-8cm and - 10cm  to leave intact 12-24 hrs as tolerated, discontinue with any problems, return rolled bandages next session. Wash and wear schedules confirmed.     Patient was encouraged to continue using her home sequential compression pump daily    Pt was educated in potential compression needs.       Discussed wear schedule, don/doff, wash and management of products.    Patient Education and Home Exercises      Education provided:   1. Educated on cause of lymphedema.  2. Explained the Complete Decongestive Therapy protocol in depth  3. Educated on Phase 1 and 2 of protocol.  4. Reviewed treatment frequency and likely duration of weeks  5. Precautions and contraindications for treatment.  6. Plan of care and goals.  7. Educated on home management protocols.     Written Home Compression program Provided: Patient to have the arm in short stretch compression bandages or compression sleeve daily.         Assessment       The pinkness and size of the edema are improved but remain and will benefit from ongoing manual lymphatic drainage to facilitate lymphatic flow.    Tiara Rasheed is a 72 y.o. female referred to outpatient occupational therapy and presents with a medical diagnosis of lymphedema secondary to breast cancer. Lymphedema, left untreated increases risk of infection, and poor body image. Patient presents with the following therapy deficits: RUE lymphedema .Following medical record review it is determined that pt will benefit from complete decongestive therapy services  for the treatment and management of this chronic condition. The following goals were discussed with the patient and patient is in agreement with them as to be addressed in the treatment plan. The patient's rehab potential is Good.   Pt would continue to benefit from skilled OT. yes     Tiara is progressing well towards her goals and there are no updates to goals at this time. Pt prognosis is Good.     Pt will continue to benefit from skilled outpatient occupational therapy to address the deficits listed in the problem list on initial evaluation provide pt/family education and to maximize pt's level of independence in the home and community environment. Plan of care is extended to continue treatment once every other week for 8 visits thru 12/28/23.    Pt's spiritual, cultural and educational needs considered and pt agreeable to plan of care and goals.    Anticipated barriers to occupational therapy: duration of lymphedema and size of the arm    Goals:   Short Term Goals for 3 weeks: (phase 1 of protocol)  1. Patient and/or caregiver will demonstrate understanding of lymphedema precautions to decrease the risk of infection and lymphedema exacerbation. - MET 6/1/2023   2. Patient will tolerate daily activities wearing multilayered bandaging.- Ongoing 10/12/2023   3. Patient and/or caregiver will order/obtain appropriate compression garments- Ongoing 10/12/2023  4. Patient will experience a decrease in girth of affected areas of 1.0 cm- Partially met 7/6/2023      Long Term Goals for 6 weeks: (Phase 2 of goals)  1. Patient and/or caregiver will be independent with donning and doffing of compression garments.  2. Patient and/or caregiver will be independent in self-bandaging and self MLD techniques.  3. Patient and/or caregiver will be independent with HEP and lymphedema management to help prevent edema relapse and reduce risk of infection.  4. Patient will experience a decreased in girth of affected areas of 2.0  cm     PLAN     Updated plan of care  9/28/2023 Continue 1 time every other week for 8 additional  ufsmfv28/2/23 thru12/28/23 to include the following interventions: Edema Control.    Ute Yeung OT, MEETAR

## 2023-10-26 ENCOUNTER — CLINICAL SUPPORT (OUTPATIENT)
Dept: REHABILITATION | Facility: HOSPITAL | Age: 73
End: 2023-10-26
Payer: MEDICARE

## 2023-10-26 DIAGNOSIS — I89.0 LYMPHEDEMA OF RIGHT ARM: Primary | ICD-10-CM

## 2023-10-26 PROCEDURE — 97140 MANUAL THERAPY 1/> REGIONS: CPT

## 2023-10-26 NOTE — PROGRESS NOTES
[facility] OUTPATIENT THERAPY AND WELLNESS  Occupational Therapy Treatment Note    Date: 10/26/2023  Name: Tiara Rasheed  Clinic Number: [id number]    Time In: 2:00  Time Out: 3:00  Total Billable Time: 60 minutes    Therapy Diagnosis:   Encounter Diagnosis   Name Primary?    Lymphedema of right arm Yes     Physician: Shiloh Tao MD  Physician Orders: OT Eval and Treat    Evaluation Date: 2/28/2023  Insurance Authorization Period Expiration: 12/31/2023  Plan of Care Expiration: 9/28//2023 request extension 12/28/23  Visit # / Visits authorized 30/ 40    Precautions:  Standard and cancer    SUBJECTIVE     Pt reports: She got bitten up by mosquitos last night and she sliced open her finger with a knife an hour before arriving, thank goodness its on the L hand. Patient states home management includes occasional use of the compression sleeve and compression pump.  Patient reports the arm remains very pink.  Response to previous treatment: the RUE is notably softer throughout following treatment.   The pinkness in the RUE remains.   It is not painful or warm.  Functional change: n/a    Pain: 7/10  Location: the legs and hips and knees today    OBJECTIVE     Pt arrived with no compression on the arm. She brought her bandages for use.    UE Girth Measurements:  LANDMARK RIGHT UE  2/28/23 RIGHT UE  10/26/23 change             E + 15cm 36.0 cm 34.5 cm -1.5cm   E + 10cm 39.0 cm 37.0 cm -2.0cm   Elbow 35.5 cm 34.5 cm -1.0 cm             W + 15cm 36.0 cm 34.5cm -1.5 cm   W + 10cm 31.0 cm 31.5 cm +0.5 cm   Wrist 20.0 cm 19.0 cm -1.0 cm   DPC 19.5 cm 18.0 cm -1.5cm   IP 6.0 cm 6.0 cm -0.0cm           Mosquito bites R hand 10/26/23    Objective Measures updated at progress report unless specified.    Treatment     Tiara received the treatments listed below:     Manual therapy techniques were applied for 60 minutes, including:    MANUAL LYMPHATIC DRAINAGE (MLD):    While supine with LEs elevated  over wedge, stimulation  performed about the lymph nodes of the head and neck.  The entire R upper quadrant received drainage. The anterior chest wall was shunted across towards the L axilla. The R  arm is full of soft pitting edema. The edema is notably softer this visit. There is continued pinkness about the upper arm, lateral arm/forearm. The upper arm was treated with scooping and pumping and deep thumb "[employer] techniques. The cubital fossa was cleared. The forearm was treated with deep thumb "[employer] techniques to break down the pitting edema, as was the dorsal hand. The digits were cleared then the entire arm was shunted up through the posterior upper arm. Patient turned to sidelying and fluid was shunted across the back towards the L axilla. The pinkness in the arm dissipates following Manual lymphatic drainage and at the end of session is somewhat lighter in color.    MULTILAYERED Bandaging: Patient was bandaged R UE with cotton stockinette, 1-6cm, 1-8cm and - 10cm  to leave intact 12-24 hrs as tolerated, discontinue with any problems, return rolled bandages next session. Wash and wear schedules confirmed.     Patient was encouraged to continue using her home sequential compression pump daily    Pt was educated in potential compression needs.       Discussed wear schedule, don/doff, wash and management of products.    Patient Education and Home Exercises      Education provided:   1. Educated on cause of lymphedema.  2. Explained the Complete Decongestive Therapy protocol in depth  3. Educated on Phase 1 and 2 of protocol.  4. Reviewed treatment frequency and likely duration of weeks  5. Precautions and contraindications for treatment.  6. Plan of care and goals.  7. Educated on home management protocols.     Written Home Compression program Provided: Patient to have the arm in short stretch compression bandages or compression sleeve daily.         Assessment       The pinkness and size of the edema are improved but remain and will  benefit from ongoing manual lymphatic drainage to facilitate lymphatic flow.    Tiara Rasheed is a 72 y.o. female referred to outpatient occupational therapy and presents with a medical diagnosis of lymphedema secondary to breast cancer. Lymphedema, left untreated increases risk of infection, and poor body image. Patient presents with the following therapy deficits: RUE lymphedema .Following medical record review it is determined that pt will benefit from complete decongestive therapy services for the treatment and management of this chronic condition. The following goals were discussed with the patient and patient is in agreement with them as to be addressed in the treatment plan. The patient's rehab potential is Good.   Pt would continue to benefit from skilled OT. yes     Tiara is progressing well towards her goals and there are no updates to goals at this time. Pt prognosis is Good.     Pt will continue to benefit from skilled outpatient occupational therapy to address the deficits listed in the problem list on initial evaluation provide pt/family education and to maximize pt's level of independence in the home and community environment. Plan of care is extended to continue treatment onceevery other week for 8 visits thru 12/28/23.    Pt's spiritual, cultural and educational needs considered and pt agreeable to plan of care and goals.    Anticipated barriers to occupational therapy: duration of lymphedema and size of the arm    Goals:   Short Term Goals for 3 weeks: (phase 1 of protocol)  1. Patient and/or caregiver will demonstrate understanding of lymphedema precautions to decrease the risk of infection and lymphedema exacerbation. - MET 6/1/2023   2. Patient will tolerate daily activities wearing multilayered bandaging.- Ongoing 10/26/2023   3. Patient and/or caregiver will order/obtain appropriate compression garments- Ongoing 10/26/2023  4. Patient will experience a decrease in girth of affected areas  of 1.0 cm- Partially met 7/6/2023      Long Term Goals for 6 weeks: (Phase 2 of goals)  1. Patient and/or caregiver will be independent with donning and doffing of compression garments.  2. Patient and/or caregiver will be independent in self-bandaging and self MLD techniques.  3. Patient and/or caregiver will be independent with HEP and lymphedema management to help prevent edema relapse and reduce risk of infection.  4. Patient will experience a decreased in girth of affected areas of 2.0 cm     PLAN     Updated plan of care  9/28/2023 Continue 1 time every other week for 8 additional  [id number]/2/23 thru12/28/23 to include the following interventions: Edema Control.    Ute Yeung OT, MEETAR

## 2023-11-14 ENCOUNTER — CLINICAL SUPPORT (OUTPATIENT)
Dept: REHABILITATION | Facility: HOSPITAL | Age: 73
End: 2023-11-14
Payer: MEDICARE

## 2023-11-14 DIAGNOSIS — I89.0 LYMPHEDEMA OF RIGHT ARM: Primary | ICD-10-CM

## 2023-11-14 PROCEDURE — 97140 MANUAL THERAPY 1/> REGIONS: CPT

## 2023-11-14 NOTE — PROGRESS NOTES
[facility]  Occupational Therapy Treatment Note    Date: 11/14/2023  Name: Tiara Rasheed  Clinic Number: [id number]    Time In: 2:00  Time Out: 3:00  Total Billable Time: 60 minutes    Therapy Diagnosis:   Encounter Diagnosis   Name Primary?    Lymphedema of right arm Yes     Physician: Shiloh Tao MD  Physician Orders: OT Eval and Treat    Evaluation Date: 2/28/2023  Insurance Authorization Period Expiration: 12/31/2023  Plan of Care Expiration: 9/28//2023 request extension 12/28/23  Visit # / Visits authorized 31/ 40    Precautions:  Standard and cancer    SUBJECTIVE     Pt reports: . Patient states home management includes occasional use of the compression sleeve and compression pump.She states she wears the bandages applied by therapist until the evening of therapy, but is unable to sleep in them.  Patient reports the arm remains very pink.  Response to previous treatment: the RUE is notably softer throughout following treatment.   The pinkness in the RUE remains.   It is not painful or warm.  Functional change: n/a    Pain: 7/10  Location: the back and R hip today    OBJECTIVE     Pt arrived with no compression on the arm. She brought her bandages for use.    The mosquito bites on the R hand are all gone.    Objective Measures updated at progress report unless specified.    Treatment     Tiara received the treatments listed below:     Manual therapy techniques were applied for 60 minutes, including:    MANUAL LYMPHATIC DRAINAGE (MLD):  Patient had a great deal of pain lifting the legs onto the mat and over the wedge this date.  While supine with LEs elevated  over wedge, stimulation performed about the lymph nodes of the head and neck.  The entire R upper quadrant received drainage. The anterior chest wall was shunted across towards the L axilla. The R  arm is full of soft pitting edema. The edema is notably softer this visit. There is decreased pinkness about the upper  arm, lateral arm/forearm. The upper arm was treated with scooping and pumping and deep thumb [place] techniques. The cubital fossa was cleared. The forearm was treated with deep thumb [place] techniques to break down the pitting edema, as was the dorsal hand. There is mild pitting over the dorsal hand this date. The digits were cleared then the entire arm was shunted up through the posterior upper arm. Patient turned to sidelying and fluid was shunted across the back towards the L axilla. The pinkness in the arm dissipates following Manual lymphatic drainage and at the end of session is notably lighter in color.    MULTILAYERED Bandaging: Patient was issued size M edema wear for the RUE in an attempt to find something she can wear overnight. Wash and wear schedules confirmed.     Patient was encouraged to continue using her home sequential compression pump daily    Pt was educated in potential compression needs.       Discussed wear schedule, don/doff, wash and management of products.    Patient Education and Home Exercises      Education provided:   1. Educated on cause of lymphedema.  2. Explained the Complete Decongestive Therapy protocol in depth  3. Educated on Phase 1 and 2 of protocol.  4. Reviewed treatment frequency and likely duration of weeks  5. Precautions and contraindications for treatment.  6. Plan of care and goals.  7. Educated on home management protocols.     Written Home Compression program Provided: Patient to have the arm in short stretch compression bandages or compression sleeve daily.         Assessment       The pinkness and size of the edema are improved but remain and will benefit from ongoing manual lymphatic drainage to facilitate lymphatic flow.    Tiara Rasheed is a 72 y.o. female referred to outpatient occupational therapy and presents with a medical diagnosis of lymphedema secondary to breast cancer. Lymphedema, left untreated increases risk of infection, and poor body image.  Patient presents with the following therapy deficits: RUE lymphedema .Following medical record review it is determined that pt will benefit from complete decongestive therapy services for the treatment and management of this chronic condition. The following goals were discussed with the patient and patient is in agreement with them as to be addressed in the treatment plan. The patient's rehab potential is Good.   Pt would continue to benefit from skilled OT. yes     Tiara is progressing well towards her goals and there are no updates to goals at this time. Pt prognosis is Good.     Pt will continue to benefit from skilled outpatient occupational therapy to address the deficits listed in the problem list on initial evaluation provide pt/family education and to maximize pt's level of independence in the home and community environment. Plan of care is extended to continue treatment once every other week for 8 visits thru 12/28/23.    Pt's spiritual, cultural and educational needs considered and pt agreeable to plan of care and goals.    Anticipated barriers to occupational therapy: duration of lymphedema and size of the arm    Goals:   Short Term Goals for 3 weeks: (phase 1 of protocol)  1. Patient and/or caregiver will demonstrate understanding of lymphedema precautions to decrease the risk of infection and lymphedema exacerbation. - MET 6/1/2023   2. Patient will tolerate daily activities wearing multilayered bandaging.- Ongoing 11/14/2023   3. Patient and/or caregiver will order/obtain appropriate compression garments- Ongoing 11/14/2023  4. Patient will experience a decrease in girth of affected areas of 1.0 cm- Partially met 7/6/2023      Long Term Goals for 6 weeks: (Phase 2 of goals)  1. Patient and/or caregiver will be independent with donning and doffing of compression garments.  2. Patient and/or caregiver will be independent in self-bandaging and self MLD techniques.  3. Patient and/or caregiver will be  independent with HEP and lymphedema management to help prevent edema relapse and reduce risk of infection.  4. Patient will experience a decreased in girth of affected areas of 2.0 cm     PLAN     Updated plan of care  9/28/2023 Continue 1 time every other week for 8 additional  [id number]/2/23 thru12/28/23 to include the following interventions: Edema Control.    Ute Yeung OT, MEETAR

## 2023-12-07 ENCOUNTER — CLINICAL SUPPORT (OUTPATIENT)
Dept: REHABILITATION | Facility: HOSPITAL | Age: 73
End: 2023-12-07
Payer: MEDICARE

## 2023-12-07 DIAGNOSIS — I89.0 LYMPHEDEMA OF RIGHT ARM: Primary | ICD-10-CM

## 2023-12-07 PROCEDURE — 97140 MANUAL THERAPY 1/> REGIONS: CPT

## 2023-12-07 NOTE — PROGRESS NOTES
[facility]  Occupational Therapy Treatment Note    Date: 12/7/2023  Name: Tiara Rasheed  Clinic Number: [id number]    Time In: 11:00  Time Out: 12:00  Total Billable Time: 60 minutes    Therapy Diagnosis:   Encounter Diagnosis   Name Primary?    Lymphedema of right arm Yes     Physician: Shiloh Tao MD  Physician Orders: OT Eval and Treat    Evaluation Date: 2/28/2023  Insurance Authorization Period Expiration: 12/31/2023  Plan of Care Expiration: 9/28//2023 request extension 12/28/23  Visit # / Visits authorized 33/ 40  FOTO statue update completed    Precautions:  Standard and cancer    SUBJECTIVE     Pt reports: . Patient states home management includes occasional use of the compression sleeve and compression pump.She states she wears the bandages applied by therapist until the evening of therapy, but is unable to sleep in them.  Patient reports the arm remains very pink.  Response to previous treatment: the RUE is notably softer throughout following treatment.   The pinkness in the RUE remains.   It is not painful or warm.  Functional change: n/a    Pain: 9/10  Location: the back and R hip today    OBJECTIVE     Pt arrived with no compression on the arm. She brought her bandages for use.        Objective Measures updated at progress report unless specified.    Treatment     Tiara received the treatments listed below:     Manual therapy techniques were applied for 60 minutes, including:    MANUAL LYMPHATIC DRAINAGE (MLD):  Patient had a great deal of pain lifting the legs onto the mat and over the wedge this date.  While supine with LEs elevated  over wedge, stimulation performed about the lymph nodes of the head and neck.  The entire R upper quadrant received drainage. The anterior chest wall was shunted across towards the L axilla. The R  arm is full of soft pitting edema. The edema is soft this visit. There is decreased pinkness about the upper arm, lateral arm/forearm.  The upper arm was treated with scooping and pumping and deep thumb [place] techniques. The cubital fossa was cleared. The forearm was treated with deep thumb [place] techniques to break down the pitting edema, as was the dorsal hand. There is trace pitting over the dorsal hand this date. The digits were cleared then the entire arm was shunted up through the posterior upper arm. Patient turned to sidelying and fluid was shunted across the back towards the L axilla. The pinkness in the arm dissipates following Manual lymphatic drainage and at the end of session is notably lighter in color.    MULTILAYERED Bandaging: Patient was bandaged in stockinette, 6, 8, 10 cm rosidal short stretch compression bandages from the dorsal hand to the axilla in the usual multilayered fashion.    Patient was encouraged to continue using her home sequential compression pump daily    Pt was educated in potential compression needs.       Discussed wear schedule, don/doff, wash and management of products.    Patient Education and Home Exercises      Education provided:   1. Educated on cause of lymphedema.  2. Explained the Complete Decongestive Therapy protocol in depth  3. Educated on Phase 1 and 2 of protocol.  4. Reviewed treatment frequency and likely duration of weeks  5. Precautions and contraindications for treatment.  6. Plan of care and goals.  7. Educated on home management protocols.     Written Home Compression program Provided: Patient to have the arm in short stretch compression bandages or compression sleeve daily.         Assessment       The pinkness and size of the edema are improved but remain and will benefit from ongoing manual lymphatic drainage to facilitate lymphatic flow.    Tiara Rasheed is a 72 y.o. female referred to outpatient occupational therapy and presents with a medical diagnosis of lymphedema secondary to breast cancer. Lymphedema, left untreated increases risk of infection, and poor body image. Patient  presents with the following therapy deficits: RUE lymphedema .Following medical record review it is determined that pt will benefit from complete decongestive therapy services for the treatment and management of this chronic condition. The following goals were discussed with the patient and patient is in agreement with them as to be addressed in the treatment plan. The patient's rehab potential is Good.   Pt would continue to benefit from skilled OT. yes     Tiara is progressing well towards her goals and there are no updates to goals at this time. Pt prognosis is Good.     Pt will continue to benefit from skilled outpatient occupational therapy to address the deficits listed in the problem list on initial evaluation provide pt/family education and to maximize pt's level of independence in the home and community environment. Plan of care is extended to continue treatment once every other week for 8 visits thru 12/28/23.    Pt's spiritual, cultural and educational needs considered and pt agreeable to plan of care and goals.    Anticipated barriers to occupational therapy: duration of lymphedema and size of the arm    Goals:   Short Term Goals for 3 weeks: (phase 1 of protocol)  1. Patient and/or caregiver will demonstrate understanding of lymphedema precautions to decrease the risk of infection and lymphedema exacerbation. - MET 6/1/2023   2. Patient will tolerate daily activities wearing multilayered bandaging.- Ongoing 12/7/2023   3. Patient and/or caregiver will order/obtain appropriate compression garments- Ongoing 12/7/2023  4. Patient will experience a decrease in girth of affected areas of 1.0 cm- Partially met 7/6/2023      Long Term Goals for 6 weeks: (Phase 2 of goals)  1. Patient and/or caregiver will be independent with donning and doffing of compression garments.  2. Patient and/or caregiver will be independent in self-bandaging and self MLD techniques.  3. Patient and/or caregiver will be  independent with HEP and lymphedema management to help prevent edema relapse and reduce risk of infection.  4. Patient will experience a decreased in girth of affected areas of 2.0 cm     PLAN     Updated plan of care  9/28/2023 Continue 1 time every other week for 8 additional  [id number]/2/23 thru12/28/23 to include the following interventions: Edema Control.    Ute Schwartz OT, MEETAR

## 2023-12-12 ENCOUNTER — CLINICAL SUPPORT (OUTPATIENT)
Dept: REHABILITATION | Facility: HOSPITAL | Age: 73
End: 2023-12-12
Payer: MEDICARE

## 2023-12-12 DIAGNOSIS — I89.0 LYMPHEDEMA OF RIGHT ARM: Primary | ICD-10-CM

## 2023-12-12 PROCEDURE — 97140 MANUAL THERAPY 1/> REGIONS: CPT

## 2023-12-12 NOTE — PROGRESS NOTES
[facility] OUTPATIENT THERAPY AND WELLNESS  Occupational Therapy Treatment Note    Date: 12/12/2023  Name: Tiara Rasheed  Clinic Number: [id number]    Time In: 2:00  Time Out: 3:00  Total Billable Time: 60 minutes    Therapy Diagnosis:   Encounter Diagnosis   Name Primary?    Lymphedema of right arm Yes     Physician: Shiloh Tao MD  Physician Orders: OT Eval and Treat    Evaluation Date: 2/28/2023  Insurance Authorization Period Expiration: 12/31/2023  Plan of Care Expiration: 12/28/23 request extension 3/31/24  Visit # / Visits authorized 34/ 40  FOTO closed    Precautions:  Standard and cancer    SUBJECTIVE     Pt reports: . Patient states home management includes occasional use of the compression sleeve and compression pump.She states she wears the bandages applied by therapist until the evening of therapy, but is unable to sleep in them.  Patient reports the arm fluctuates in color, sometimes very pink but not so much today. .  Response to previous treatment: the RUE is notably softer throughout following treatment.   The pinkness in the RUE remains.   It is not painful or warm. There is a measurable decrease in the edema of the RUE over the course of the year of treatment.  Functional change: n/a    Pain: 7/10  Location: the back and R hip today. Patient denies pain in the RUE with lymphedema. She reports ongoing back/hip/leg pain managed by pain management.    OBJECTIVE     Pt arrived with no compression on the arm. She brought her bandages for use.    UE Girth Measurements:  LANDMARK RIGHT UE  2/28/23 RIGHT UE  12/12/23 change             E + 15cm 36.0 cm 33.5 cm -2.5 cm   E + 10cm 39.0 cm 35.0 cm -4.0cm   Elbow 35.5 cm 34.0 cm -1.5 cm             W + 15cm 36.0 cm 34.0 cm -2.0 cm   W + 10cm 31.0 cm 31.0 cm 0.0 cm   Wrist 20.0 cm 19.5 cm -0.5 cm   DPC 19.5 cm 18.0 cm -1.5 cm   IP 6.0 cm 6.0 cm -0.0cm         Objective Measures updated at progress report unless specified.    Treatment     Tiara received  the treatments listed below:     Manual therapy techniques were applied for 60 minutes, including:    MANUAL LYMPHATIC DRAINAGE (MLD):  Patient had difficulty lifting the legs onto the mat and over the wedge this date.  While supine with LEs elevated  over wedge, stimulation performed about the lymph nodes of the head and neck.  The entire R upper quadrant received drainage. The anterior chest wall was shunted across towards the L axilla. The R  arm is full of soft pitting edema. The edema is very soft this visit. There is decreased pinkness about the upper arm, lateral arm/forearm. The upper arm was treated with scooping and pumping and deep thumb [place] techniques. The cubital fossa was cleared. The forearm was treated with deep thumb [place] techniques to break down the pitting edema, as was the dorsal hand. There is deeply pitting edema throughout the lower arm. There is trace pitting over the dorsal hand this date. The digits were cleared then the entire arm was shunted up through the posterior upper arm. Patient turned to sidelying and fluid was shunted across the back towards the L axilla. The pinkness in the arm dissipates following Manual lymphatic drainage and at the end of session is notably lighter in color.    MULTILAYERED Bandaging: Patient was bandaged in stockinette, 6, 8, 10 cm rosidal short stretch compression bandages from the dorsal hand to the axilla in the usual multilayered fashion.    Patient was encouraged to continue using her home sequential compression pump daily    Pt was educated in potential compression needs.       Discussed wear schedule, don/doff, wash and management of products.    Patient Education and Home Exercises      Education provided:   1. Educated on cause of lymphedema.  2. Explained the Complete Decongestive Therapy protocol in depth  3. Educated on Phase 1 and 2 of protocol.  4. Reviewed treatment frequency and likely duration of weeks  5. Precautions and  contraindications for treatment.  6. Plan of care and goals.  7. Educated on home management protocols.     Written Home Compression program Provided: Patient to have the arm in short stretch compression bandages or compression sleeve daily.         Assessment       The pinkness and size of the edema are improved but remain and will benefit from ongoing manual lymphatic drainage to facilitate lymphatic flow.  There is a measurable decrease in the size of the arm over the year.     Tiara Rasheed is a 72 y.o. female referred to outpatient occupational therapy and presents with a medical diagnosis of lymphedema secondary to breast cancer. Lymphedema, left untreated increases risk of infection, and poor body image. Patient presents with the following therapy deficits: RUE lymphedema .Following medical record review it is determined that pt will benefit from complete decongestive therapy services for the treatment and management of this chronic condition. The following goals were discussed with the patient and patient is in agreement with them as to be addressed in the treatment plan.     The patient's rehab potential is Good.   Pt would continue to benefit from skilled OT. yes     Tiara is progressing well towards her goals and there are no updates to goals at this time. Pt prognosis is Good.     Pt will continue to benefit from skilled outpatient occupational therapy to address the deficits listed in the problem list on initial evaluation provide pt/family education and to maximize pt's level of independence in the home management of this chronic condition.     Plan of care is extended to continue treatment once every other week thru 3/31/2024    Pt's spiritual, cultural and educational needs considered and pt agreeable to plan of care and goals.    Anticipated barriers to occupational therapy: duration of lymphedema and size of the arm    Goals:   Short Term Goals: (phase 1 of protocol)  1. Patient and/or  caregiver will demonstrate understanding of lymphedema precautions to decrease the risk of infection and lymphedema exacerbation. - MET 6/1/2023   2. Patient will tolerate daily activities wearing multilayered bandaging.- Ongoing 12/12/2023   3. Patient and/or caregiver will order/obtain appropriate compression garments- Ongoing 12/12/2023  4. Patient will experience a decrease in girth of affected areas of 1.0 cm- Partially met 12/12/2023 see girth chart      Long Term Goals for 6 weeks: (Phase 2 of goals)  1. Patient and/or caregiver will be independent with donning and doffing of compression garments.  2. Patient and/or caregiver will be independent in self-bandaging and self MLD techniques.  3. Patient and/or caregiver will be independent with HEP and lymphedema management to help prevent edema relapse and reduce risk of infection.  4. Patient will experience a decreased in girth of affected areas of 2.0 cm Partially met 12/12/2023  see girth chart     PLAN     Updated plan of care 12/12/2023:   Continue 1 time every other week thru  3/31 /23 to include the following interventions:Manual Lymphatic Drainage, short stretch compression bandaging.    Ute Schwartz, OT, LOTR

## 2024-01-02 ENCOUNTER — CLINICAL SUPPORT (OUTPATIENT)
Dept: REHABILITATION | Facility: HOSPITAL | Age: 74
End: 2024-01-02
Payer: MEDICARE

## 2024-01-02 DIAGNOSIS — I89.0 LYMPHEDEMA OF RIGHT ARM: Primary | ICD-10-CM

## 2024-01-02 PROCEDURE — 97140 MANUAL THERAPY 1/> REGIONS: CPT

## 2024-01-02 NOTE — PROGRESS NOTES
[facility]  Occupational Therapy Treatment Note    Date: 1/2/2024  Name: Tiara Rasheed  Clinic Number: [id number]    Time In: 12:30  Time Out: 1:30  Total Billable Time: 60 minutes    Therapy Diagnosis:   Encounter Diagnosis   Name Primary?    Lymphedema of right arm Yes     Physician: Shiloh Tao MD  Physician Orders: OT Eval and Treat    Evaluation Date: 2/28/2023  Insurance Authorization Period Expiration: 12/31/2023  Plan of Care Expiration: 12/28/23 request extension 3/31/24  Visit # / Visits authorized 1/8  FOTO closed    Precautions:  Standard and cancer    SUBJECTIVE     Pt reports: . Patient states home management includes occasional use of the compression sleeve and compression pump.She states she wears the bandages applied by therapist until the evening of therapy, but is unable to sleep in them.  Patient reports the arm fluctuates in color, sometimes very pink but not so much today. .  Response to previous treatment: the RUE is notably softer throughout following treatment.   The pinkness in the RUE remains very light this visit.   It is not painful or warm. There is a measurable decrease in the edema of the RUE over the course of the year of treatment.  Functional change: n/a    Pain: 7/10  Location: the RIGHT LOWER EXTREMITY and R hip today. Patient denies pain in the RUE with lymphedema. She reports ongoing back/hip/leg pain managed by pain management.    OBJECTIVE     Pt arrived with no compression on the arm. She brought her bandages for use.      Objective Measures updated at progress report unless specified.    Treatment     Tiara received the treatments listed below:     Manual therapy techniques were applied for 60 minutes, including:    MANUAL LYMPHATIC DRAINAGE (MLD):  Patient had difficulty lifting the legs onto the mat and over the wedge this date.  While supine with LEs elevated  over wedge, stimulation performed about the lymph nodes of the head and  neck.  The entire R upper quadrant received drainage. The anterior chest wall was shunted across towards the L axilla. The R  arm is full of soft pitting edema. The edema is very soft this visit. There is decreased pinkness about the upper arm, lateral arm/forearm. The upper arm was treated with scooping and pumping and deep thumb [place] techniques. The cubital fossa was cleared. The forearm was treated with deep thumb [place] techniques to break down the pitting edema, as was the dorsal hand. There is deeply pitting edema throughout the lower arm. There is trace pitting over the dorsal hand this date. The digits were cleared then the entire arm was shunted up through the posterior upper arm. Patient turned to sidelying and fluid was shunted across the back towards the L axilla. The pinkness in the arm dissipates following Manual lymphatic drainage and at the end of session is notably lighter in color.    MULTILAYERED Bandaging: Patient was bandaged in stockinette, 6, 8, 10 cm rosidal short stretch compression bandages from the dorsal hand to the axilla in the usual multilayered fashion.    Patient was encouraged to continue using her home sequential compression pump daily    Pt was educated in potential compression needs.       Discussed wear schedule, don/doff, wash and management of products.    Patient Education and Home Exercises      Education provided:   1. Educated on cause of lymphedema.  2. Explained the Complete Decongestive Therapy protocol in depth  3. Educated on Phase 1 and 2 of protocol.  4. Reviewed treatment frequency and likely duration of weeks  5. Precautions and contraindications for treatment.  6. Plan of care and goals.  7. Educated on home management protocols.     Written Home Compression program Provided: Patient to have the arm in short stretch compression bandages or compression sleeve daily.         Assessment       The pinkness and size of the edema are improved but remain and will  benefit from ongoing manual lymphatic drainage to facilitate lymphatic flow.  There is a measurable decrease in the size of the arm over the year.     Tiara Rasheed is a 72 y.o. female referred to outpatient occupational therapy and presents with a medical diagnosis of lymphedema secondary to breast cancer. Lymphedema, left untreated increases risk of infection, and poor body image. Patient presents with the following therapy deficits: RUE lymphedema .Following medical record review it is determined that pt will benefit from complete decongestive therapy services for the treatment and management of this chronic condition. The following goals were discussed with the patient and patient is in agreement with them as to be addressed in the treatment plan.     The patient's rehab potential is Good.   Pt would continue to benefit from skilled OT. yes     Tiara is progressing well towards her goals and there are no updates to goals at this time. Pt prognosis is Good.     Pt will continue to benefit from skilled outpatient occupational therapy to address the deficits listed in the problem list on initial evaluation provide pt/family education and to maximize pt's level of independence in the home management of this chronic condition.     Plan of care is extended to continue treatment once every other week thru 3/31/2024    Pt's spiritual, cultural and educational needs considered and pt agreeable to plan of care and goals.    Anticipated barriers to occupational therapy: duration of lymphedema and size of the arm    Goals:   Short Term Goals: (phase 1 of protocol)  1. Patient and/or caregiver will demonstrate understanding of lymphedema precautions to decrease the risk of infection and lymphedema exacerbation. - MET 6/1/2023   2. Patient will tolerate daily activities wearing multilayered bandaging.- Ongoing 1/2/2024  3. Patient and/or caregiver will order/obtain appropriate compression garments- Ongoing 1/2/2024  4.  Patient will experience a decrease in girth of affected areas of 1.0 cm- Partially met 1/2/2024      Long Term Goals for 6 weeks: (Phase 2 of goals)  1. Patient and/or caregiver will be independent with donning and doffing of compression garments.  2. Patient and/or caregiver will be independent in self-bandaging and self MLD techniques.  3. Patient and/or caregiver will be independent with HEP and lymphedema management to help prevent edema relapse and reduce risk of infection.  4. Patient will experience a decreased in girth of affected areas of 2.0 cm Partially met 12/12/2023  see girth chart     PLAN     Updated plan of care 12/12/2023:   Continue 1 time every other week thru  3/31 /23 to include the following interventions:Manual Lymphatic Drainage, short stretch compression bandaging.    Ute Schwartz OT, LOTR

## 2024-01-30 ENCOUNTER — CLINICAL SUPPORT (OUTPATIENT)
Dept: REHABILITATION | Facility: HOSPITAL | Age: 74
End: 2024-01-30
Payer: MEDICARE

## 2024-01-30 DIAGNOSIS — I89.0 LYMPHEDEMA OF RIGHT ARM: Primary | ICD-10-CM

## 2024-01-30 PROCEDURE — 97140 MANUAL THERAPY 1/> REGIONS: CPT

## 2024-01-30 NOTE — PROGRESS NOTES
[facility]  Occupational Therapy Treatment Note    Date: 1/30/2024  Name: Tiara Rasheed  [facility] Number: [id number]    Time In: 11:00  Time Out: 12:00  Total Billable Time: 60 minutes    Therapy Diagnosis:   Encounter Diagnosis   Name Primary?    Lymphedema of right arm Yes     Physician: Shiloh Tao MD  Physician Orders: OT Eval and Treat    Evaluation Date: 2/28/2023  Insurance Authorization Period Expiration: 12/31/2023  Plan of Care Expiration: 12/28/23 request extension 3/31/24  Visit # / Visits authorized 2/8  FOTO closed    Precautions:  Standard and cancer    SUBJECTIVE     Pt reports: She cancelled last visit due to the freeze.  She has not been seen in 4 weeks for treatment.  Patient reports Feeling very melancholy.  Her  has had a health scare and she states if anything happens to him, she would have no one . Patient states home management includes occasional use of the compression sleeve and compression pump.She states she wears the bandages applied by therapist until the evening of therapy, but is unable to sleep in them.  Patient reports the arm fluctuates in color, sometimes very pink but not so much today. .  Response to previous treatment: the RUE is notably softer throughout following treatment.   The pinkness in the RUE remains very light this visit.   It is not painful or warm. There is a measurable decrease in the edema of the RUE over the course of the year of treatment.  Functional change: n/a    Pain: 7/10  Location: the RIGHT LOWER EXTREMITY and R hip today. Patient denies pain in the RUE with lymphedema. She reports ongoing back/hip/leg pain managed by pain management.    OBJECTIVE     Pt arrived with no compression on the arm. She brought her bandages for use.  UE Girth Measurements:  LANDMARK RIGHT UE  2/28/23 RIGHT UE  1/30/24 change             E + 15cm 36.0 cm 36.5 cm +0.5cm   E + 10cm 39.0 cm 40.0 cm +1.0cm   Elbow 35.5 cm 33.0 cm -1.5cm              W + 15cm 36.0 cm 35.0 cm -1.0 cm   W + 10cm 31.0 cm 30.5 cm -0.5cm   Wrist 20.0 cm 19.0 cm -1.0cm   DPC 19.5 cm 18.5cm -1.0 cm   IP 6.0 cm 6.0 cm -0.0cm         Objective Measures updated at progress report unless specified.    Treatment     Tiara received the treatments listed below:     Manual therapy techniques were applied for 60 minutes, including:    MANUAL LYMPHATIC DRAINAGE (MLD):  Patient had difficulty lifting the legs onto the mat and over the wedge this date.  While supine with LEs elevated  over wedge, stimulation performed about the lymph nodes of the head and neck.  The entire R upper quadrant received drainage. The anterior chest wall was shunted across towards the L axilla. The R  arm is full of soft pitting edema. The edema is notably larger in the upper arm this visit. There is decreased pinkness about the upper arm, lateral arm/forearm. The upper arm was treated with scooping and pumping and deep thumb [place] techniques. The cubital fossa was cleared. The forearm was treated with deep thumb [place] techniques to break down the pitting edema, as was the dorsal hand. There is deeply pitting edema throughout the lower arm. There is trace pitting over the dorsal hand this date. The digits were cleared then the entire arm was shunted up through the posterior upper arm. Patient turned to sidelying and fluid was shunted across the back towards the L axilla. The pinkness in the arm dissipates following Manual lymphatic drainage and at the end of session is notably lighter in color.    MULTILAYERED Bandaging: Patient was bandaged in stockinette, 6, 8, 10 cm rosidal short stretch compression bandages from the dorsal hand to the axilla in the usual multilayered fashion.    Patient was encouraged to resume using her home sequential compression pump daily    Pt was educated in potential compression needs.       Discussed wear schedule, don/doff, wash and management of products.    Patient Education  and Home Exercises      Education provided:   1. Educated on cause of lymphedema.  2. Explained the Complete Decongestive Therapy protocol in depth  3. Educated on Phase 1 and 2 of protocol.  4. Reviewed treatment frequency and likely duration of weeks  5. Precautions and contraindications for treatment.  6. Plan of care and goals.  7. Educated on home management protocols.     Written Home Compression program Provided: Patient to have the arm in short stretch compression bandages or compression sleeve daily.         Assessment       The pinkness and size of the edema are improved but remain and will benefit from ongoing manual lymphatic drainage to facilitate lymphatic flow.  There is a measurable decrease in the size of the arm over the year.     Tiara Rasheed is a 72 y.o. female referred to outpatient occupational therapy and presents with a medical diagnosis of lymphedema secondary to breast cancer. Lymphedema, left untreated increases risk of infection, and poor body image. Patient presents with the following therapy deficits: RUE lymphedema .Following medical record review it is determined that pt will benefit from complete decongestive therapy services for the treatment and management of this chronic condition. The following goals were discussed with the patient and patient is in agreement with them as to be addressed in the treatment plan.     The patient's rehab potential is Good.   Pt would continue to benefit from skilled OT. yes     Tiara is progressing well towards her goals and there are no updates to goals at this time. Pt prognosis is Good.     Pt will continue to benefit from skilled outpatient occupational therapy to address the deficits listed in the problem list on initial evaluation provide pt/family education and to maximize pt's level of independence in the home management of this chronic condition.     Plan of care is extended to continue treatment once every other week thru  3/31/2024    Pt's spiritual, cultural and educational needs considered and pt agreeable to plan of care and goals.    Anticipated barriers to occupational therapy: duration of lymphedema and size of the arm    Goals:   Short Term Goals: (phase 1 of protocol)  1. Patient and/or caregiver will demonstrate understanding of lymphedema precautions to decrease the risk of infection and lymphedema exacerbation. - MET 6/1/2023   2. Patient will tolerate daily activities wearing multilayered bandaging.- Ongoing 1/30/2024  3. Patient and/or caregiver will order/obtain appropriate compression garments- Ongoing 1/30/2024  4. Patient will experience a decrease in girth of affected areas of 1.0 cm- Partially met 1/30/2024      Long Term Goals for 6 weeks: (Phase 2 of goals)  1. Patient and/or caregiver will be independent with donning and doffing of compression garments.  2. Patient and/or caregiver will be independent in self-bandaging and self MLD techniques.  3. Patient and/or caregiver will be independent with HEP and lymphedema management to help prevent edema relapse and reduce risk of infection.  4. Patient will experience a decreased in girth of affected areas of 2.0 cm Partially met 12/12/2023  see girth chart     PLAN     Updated plan of care 12/12/2023:   Continue 1 time every other week thru  3/31 /23 to include the following interventions:Manual Lymphatic Drainage, short stretch compression bandaging.    Ute Schwartz OT, SOFIA

## 2024-02-15 ENCOUNTER — CLINICAL SUPPORT (OUTPATIENT)
Dept: REHABILITATION | Facility: HOSPITAL | Age: 74
End: 2024-02-15
Payer: MEDICARE

## 2024-02-15 DIAGNOSIS — I89.0 LYMPHEDEMA OF RIGHT ARM: Primary | ICD-10-CM

## 2024-02-15 PROCEDURE — 97140 MANUAL THERAPY 1/> REGIONS: CPT

## 2024-02-15 NOTE — PROGRESS NOTES
[facility] OUTPATIENT THERAPY AND WELLNESS  Occupational Therapy Treatment Note    Date: 2/15/2024  Name: Tiara Rasheed  Clinic Number: [id number]    Time In: 1:00  Time Out: 1:00  Total Billable Time: 60 minutes    Therapy Diagnosis:   Encounter Diagnosis   Name Primary?    Lymphedema of right arm Yes     Physician: Shiloh Tao MD  Physician Orders: OT Eval and Treat    Evaluation Date: 2/28/2023  Insurance Authorization Period Expiration: 12/31/2023  Plan of Care Expiration: 12/28/23 request extension 3/31/24  Visit # / Visits authorized 3/8  FOTO closed    Precautions:  Standard and cancer    SUBJECTIVE     Pt reports: Patient states home management includes very occasional use of the compression sleeve and several time a week use of the compression pump.She states she wears the bandages applied by therapist until the evening of therapy, but is unable to sleep in them.  Patient reports the arm fluctuates in color, sometimes very pink but not so much today. .  Response to previous treatment: the RUE is notably softer throughout following treatment.   The pinkness in the RUE remains very light this visit.   It is not painful or warm. There is a measurable decrease in the edema of the RUE over the course of the year of treatment.  Functional change: n/a    Pain: 7/10  Location: the RIGHT LOWER EXTREMITY and R hip today. Patient denies pain in the RUE with lymphedema. She reports ongoing back/hip/leg pain managed by pain management.    OBJECTIVE     Pt arrived with no compression on the arm. She brought her bandages for use.         Objective Measures updated at progress report unless specified.    Treatment     Tiara received the treatments listed below:     Manual therapy techniques were applied for 60 minutes, including:    MANUAL LYMPHATIC DRAINAGE (MLD):  Patient had difficulty lifting the legs onto the mat and over the wedge again this date.  While supine with LEs elevated  over wedge, stimulation  performed about the lymph nodes of the head and neck.  The entire R upper quadrant received drainage. The anterior chest wall was shunted across towards the L axilla. The R  arm is full of soft pitting edema. The edema is notably larger in the upper arm this visit. There is decreased pinkness about the upper arm, lateral arm/forearm. The upper arm was treated with scooping and pumping and deep thumb [place] techniques. The cubital fossa was cleared. The forearm was treated with deep thumb [place] techniques to break down the pitting edema, as was the dorsal hand. There is deeply pitting edema throughout the lower arm. There is trace pitting over the dorsal hand this date. The digits were cleared then the entire arm was shunted up through the posterior upper arm. Patient turned to sidelying and fluid was shunted across the back towards the L axilla. The pinkness in the arm dissipates following Manual lymphatic drainage and at the end of session is notably lighter in color.    MULTILAYERED Bandaging: Patient was bandaged in stockinette, 6, 8, 10 cm rosidal short stretch compression bandages from the dorsal hand to the axilla in the usual multilayered fashion.    Patient was encouraged to resume using her home sequential compression pump daily.  Patient was encouraged to use the daytime compression sleeve.     Pt was educated in potential compression needs.       Discussed wear schedule, don/doff, wash and management of products.    Patient Education and Home Exercises      Education provided:   1. Educated on cause of lymphedema.  2. Explained the Complete Decongestive Therapy protocol in depth  3. Educated on Phase 1 and 2 of protocol.  4. Reviewed treatment frequency and likely duration of weeks  5. Precautions and contraindications for treatment.  6. Plan of care and goals.  7. Educated on home management protocols.     Written Home Compression program Provided: Patient to have the arm in short stretch compression  bandages or compression sleeve daily.         Assessment       The pinkness and size of the edema are improved but remain and will benefit from ongoing manual lymphatic drainage to facilitate lymphatic flow.  There is a measurable decrease in the size of the arm over the year.     Tiara Rasheed is a 72 y.o. female referred to outpatient occupational therapy and presents with a medical diagnosis of lymphedema secondary to breast cancer. Lymphedema, left untreated increases risk of infection, and poor body image. Patient presents with the following therapy deficits: RUE lymphedema .Following medical record review it is determined that pt will benefit from complete decongestive therapy services for the treatment and management of this chronic condition. The following goals were discussed with the patient and patient is in agreement with them as to be addressed in the treatment plan.     The patient's rehab potential is Good.   Pt would continue to benefit from skilled OT. yes     Tiara is progressing well towards her goals and there are no updates to goals at this time. Pt prognosis is Good.     Pt will continue to benefit from skilled outpatient occupational therapy to address the deficits listed in the problem list on initial evaluation provide pt/family education and to maximize pt's level of independence in the home management of this chronic condition.     Plan of care is extended to continue treatment once every other week thru 3/31/2024    Pt's spiritual, cultural and educational needs considered and pt agreeable to plan of care and goals.    Anticipated barriers to occupational therapy: duration of lymphedema and size of the arm    Goals:   Short Term Goals: (phase 1 of protocol)  1. Patient and/or caregiver will demonstrate understanding of lymphedema precautions to decrease the risk of infection and lymphedema exacerbation. - MET 6/1/2023   2. Patient will tolerate daily activities wearing multilayered  bandaging.- Ongoing 2/15/2024  3. Patient and/or caregiver will order/obtain appropriate compression garments- Ongoing 2/15/2024  4. Patient will experience a decrease in girth of affected areas of 1.0 cm- Partially met 1/30/2024      Long Term Goals for 6 weeks: (Phase 2 of goals)  1. Patient and/or caregiver will be independent with donning and doffing of compression garments.  2. Patient and/or caregiver will be independent in self-bandaging and self MLD techniques.  3. Patient and/or caregiver will be independent with HEP and lymphedema management to help prevent edema relapse and reduce risk of infection.  4. Patient will experience a decreased in girth of affected areas of 2.0 cm Partially met 12/12/2023  see girth chart     PLAN     Updated plan of care 12/12/2023:   Continue 1 time every other week thru  3/31 /23 to include the following interventions:Manual Lymphatic Drainage, short stretch compression bandaging.    Ute Schwartz OT, MEETAR

## 2024-02-29 ENCOUNTER — CLINICAL SUPPORT (OUTPATIENT)
Dept: REHABILITATION | Facility: HOSPITAL | Age: 74
End: 2024-02-29
Payer: MEDICARE

## 2024-02-29 DIAGNOSIS — I89.0 LYMPHEDEMA OF RIGHT ARM: Primary | ICD-10-CM

## 2024-02-29 PROCEDURE — 97140 MANUAL THERAPY 1/> REGIONS: CPT

## 2024-02-29 NOTE — PROGRESS NOTES
[facility] OUTPATIENT THERAPY AND [facility]LL[facility]  Occupational Therapy Treatment Note    Date: 2/29/2024  Name: Tiara Rasheed  Clinic Number: [id number]    Time In: 1:00  Time Out: 2:00  Total Billable Time: 60 minutes    Therapy Diagnosis:   Encounter Diagnosis   Name Primary?    Lymphedema of right arm Yes     Physician: Shiloh Tao MD  Physician Orders: OT Eval and Treat    Evaluation Date: 2/28/2023  Insurance Authorization Period Expiration: 12/31/2023  Plan of Care Expiration: 12/28/23 request extension 3/31/24  Visit # / Visits authorized 4/8  FOTO closed    Precautions:  Standard and cancer    SUBJECTIVE     Pt reports: Patient states home management includes very occasional use of the compression sleeve and several time a week use of the compression pump.She states she wears the bandages applied by therapist until the evening of therapy, but is unable to sleep in them.  Patient reports the arm fluctuates in color, sometimes very pink but not so much today.   Response to previous treatment: the RUE is soft throughout following treatment.   The pinkness in the RUE remains very light this visit.   It is not painful or warm. There is a measurable decrease in the edema of the RUE over the course of the year of treatment.  Functional change: n/a    Pain: 7/10  Location: the RIGHT LOWER EXTREMITY and R hip today. Patient denies pain in the RUE with lymphedema. She reports ongoing back/hip/leg pain managed by pain management.    OBJECTIVE     Pt arrived with no compression on the arm. She brought her bandages for use.         Objective Measures updated at progress report unless specified.    Treatment     Tiara received the treatments listed below:     Manual therapy techniques were applied for 60 minutes, including:    MANUAL LYMPHATIC DRAINAGE (MLD):  Patient had difficulty lifting the legs onto the mat and over the wedge again this date.  While supine with LEs elevated  over wedge, stimulation performed about  the lymph nodes of the head and neck.  The entire R upper quadrant received drainage. The anterior chest wall was shunted across towards the L axilla. The R  arm is full of soft pitting edema. The edema is  large in the upper arm this visit. There is decreased pinkness about the upper arm, lateral arm/forearm. The upper arm was treated with scooping and pumping and deep thumb [place] techniques. The cubital fossa was cleared. The forearm was treated with deep thumb [place] techniques to break down the pitting edema, as was the dorsal hand. There is moderately pitting edema throughout the lower arm. There is trace pitting over the dorsal hand this date. The digits were cleared then the entire arm was shunted up through the posterior upper arm. Patient turned to sidelying and fluid was shunted across the back towards the L axilla. The pinkness in the arm dissipates following Manual lymphatic drainage and at the end of session is notably lighter in color.    MULTILAYERED Bandaging: Patient was bandaged in stockinette, 6, 8, 10 cm rosidal short stretch compression bandages from the dorsal hand to the axilla in the usual multilayered fashion.    Patient was encouraged to resume using her home sequential compression pump daily.  Patient was encouraged to use the daytime compression sleeve.     Pt was educated in potential compression needs.       Discussed wear schedule, don/doff, wash and management of products.    Patient Education and Home Exercises      Education provided:   1. Educated on cause of lymphedema.  2. Explained the Complete Decongestive Therapy protocol in depth  3. Educated on Phase 1 and 2 of protocol.  4. Reviewed treatment frequency and likely duration of weeks  5. Precautions and contraindications for treatment.  6. Plan of care and goals.  7. Educated on home management protocols.     Written Home Compression program Provided: Patient to have the arm in short stretch compression bandages or  compression sleeve daily.         Assessment       The pinkness and size of the edema are improved but remain and will benefit from ongoing manual lymphatic drainage to facilitate lymphatic flow.  There is a measurable decrease in the size of the arm over the year.     Tiara Rasheed is a 72 y.o. female referred to outpatient occupational therapy and presents with a medical diagnosis of lymphedema secondary to breast cancer. Lymphedema, left untreated increases risk of infection, and poor body image. Patient presents with the following therapy deficits: RUE lymphedema .Following medical record review it is determined that pt will benefit from complete decongestive therapy services for the treatment and management of this chronic condition. The following goals were discussed with the patient and patient is in agreement with them as to be addressed in the treatment plan.     The patient's rehab potential is Good.   Pt would continue to benefit from skilled OT. yes     Tiara is progressing well towards her goals and there are no updates to goals at this time. Pt prognosis is Good.     Pt will continue to benefit from skilled outpatient occupational therapy to address the deficits listed in the problem list on initial evaluation provide pt/family education and to maximize pt's level of independence in the home management of this chronic condition.     Plan of care is extended to continue treatment once every other week thru 3/31/2024    Pt's spiritual, cultural and educational needs considered and pt agreeable to plan of care and goals.    Anticipated barriers to occupational therapy: duration of lymphedema and size of the arm    Goals:   Short Term Goals: (phase 1 of protocol)  1. Patient and/or caregiver will demonstrate understanding of lymphedema precautions to decrease the risk of infection and lymphedema exacerbation. - MET 6/1/2023   2. Patient will tolerate daily activities wearing multilayered bandaging.-  Ongoing 2/29/2024  3. Patient and/or caregiver will order/obtain appropriate compression garments- Ongoing 2/29/2024  4. Patient will experience a decrease in girth of affected areas of 1.0 cm- Partially met 1/30/2024      Long Term Goals for 6 weeks: (Phase 2 of goals)  1. Patient and/or caregiver will be independent with donning and doffing of compression garments.  2. Patient and/or caregiver will be independent in self-bandaging and self MLD techniques.  3. Patient and/or caregiver will be independent with HEP and lymphedema management to help prevent edema relapse and reduce risk of infection.  4. Patient will experience a decreased in girth of affected areas of 2.0 cm Partially met 12/12/2023  see girth chart     PLAN     Updated plan of care 12/12/2023:   Continue 1 time every other week thru  3/31 /24 to include the following interventions:Manual Lymphatic Drainage, short stretch compression bandaging.    Ute Schwartz OT, MEETAR

## 2024-03-12 ENCOUNTER — CLINICAL SUPPORT (OUTPATIENT)
Dept: REHABILITATION | Facility: HOSPITAL | Age: 74
End: 2024-03-12
Payer: MEDICARE

## 2024-03-12 DIAGNOSIS — I89.0 LYMPHEDEMA OF RIGHT ARM: Primary | ICD-10-CM

## 2024-03-12 PROCEDURE — 97140 MANUAL THERAPY 1/> REGIONS: CPT

## 2024-03-12 NOTE — PROGRESS NOTES
[facility]  Occupational Therapy Treatment Note    Date: 3/12/2024  Name: Tiara Rasheed  [facility] Number: [id number]    Time In: 12:30  Time Out:1:30  Total Billable Time: 60 minutes    Therapy Diagnosis:   Encounter Diagnosis   Name Primary?    Lymphedema of right arm Yes     Physician: Shiloh Tao MD  Physician Orders: OT Eval and Treat    Evaluation Date: 2/28/2023  Insurance Authorization Period Expiration: 12/31/2023  Plan of Care Expiration: 12/28/23 request extension 3/31/24  Visit # / Visits authorized 5/8  FOTO closed    Precautions:  Standard and cancer    SUBJECTIVE     Pt reports: Patient states home management includes very occasional use of the compression sleeve and several time a week use of the compression pump.She states she wears the bandages applied by therapist until the evening of therapy, but is unable to sleep in them.  Patient reports the arm fluctuates in color, sometimes very pink but not so much today.   Response to previous treatment: the RUE is soft throughout following treatment.   The pinkness in the RUE is very light this visit.   It is not painful or warm. There is a measurable decrease in the edema of the RUE over the course of the year of treatment.  Functional change: n/a    Pain: 7/10  Location: the RIGHT & LEFT LOWER EXTREMITY and R hip today. Patient denies pain in the RUE with lymphedema. She reports ongoing back/hip/leg pain managed by pain management.    OBJECTIVE     Pt arrived with no compression on the arm. She brought her bandages for use.     UE Girth Measurements:  LANDMARK RIGHT UE  2/28/23 RIGHT UE  3/12/24 change             E + 15cm 36.0 cm 34.5 cm -1.5 cm   E + 10cm 39.0 cm 36.5 cm -2.5cm   Elbow 35.5 cm 33.5 cm -2.0 cm             W + 15cm 36.0 cm 33.5 cm -2.5 cm   W + 10cm 31.0 cm 30.0 cm -1.0 cm   Wrist 20.0 cm 20.0cm 0.0cm   DPC 19.5 cm 19.0cm -0.5cm   IP 6.0 cm 6.0 cm -0.0cm         Objective Measures updated at progress  report unless specified.    Treatment     Tiara received the treatments listed below:     Manual therapy techniques were applied for 60 minutes, including:    MANUAL LYMPHATIC DRAINAGE (MLD):  Patient had difficulty lifting the legs onto the mat and over the wedge again this date.  While supine with LEs elevated  over wedge, stimulation performed about the lymph nodes of the head and neck.  The entire R upper quadrant received drainage. The anterior chest wall was shunted across towards the L axilla. The R  arm is full of soft pitting edema. The edema is  very soft but large in the upper arm this visit. There is decreased pinkness about the upper arm, lateral arm/forearm. The upper arm was treated with scooping and pumping and deep thumb [place] techniques. The cubital fossa was cleared. The forearm was treated with deep thumb [place] techniques to break down the pitting edema, as was the dorsal hand. There is moderately pitting edema throughout the lower arm. There is trace pitting over the dorsal hand this date. The digits were cleared then the entire arm was shunted up through the posterior upper arm. Patient turned to sidelying and fluid was shunted across the back towards the L axilla. The pinkness in the arm dissipates following Manual lymphatic drainage and at the end of session is notably lighter in color.    MULTILAYERED Bandaging: Patient was bandaged in stockinette, 6, 8, 10 cm rosidal short stretch compression bandages from the dorsal hand to the axilla in the usual multilayered fashion.    Patient was encouraged to resume using her home sequential compression pump daily.  Patient was encouraged to use the daytime compression sleeve.     Pt was educated in potential compression needs.       Discussed wear schedule, don/doff, wash and management of products.    Patient Education and Home Exercises      Education provided:   1. Educated on cause of lymphedema.  2. Explained the Complete Decongestive  Therapy protocol in depth  3. Educated on Phase 1 and 2 of protocol.  4. Reviewed treatment frequency and likely duration of weeks  5. Precautions and contraindications for treatment.  6. Plan of care and goals.  7. Educated on home management protocols.     Written Home Compression program Provided: Patient to have the arm in short stretch compression bandages or compression sleeve daily.         Assessment       The pinkness and size of the edema are improved but remain and will benefit from ongoing manual lymphatic drainage to facilitate lymphatic flow.  There is a measurable decrease in the size of the arm over the year.     Tiara Rasheed is a 72 y.o. female referred to outpatient occupational therapy and presents with a medical diagnosis of lymphedema secondary to breast cancer. Lymphedema, left untreated increases risk of infection, and poor body image. Patient presents with the following therapy deficits: RUE lymphedema .Following medical record review it is determined that pt will benefit from complete decongestive therapy services for the treatment and management of this chronic condition. The following goals were discussed with the patient and patient is in agreement with them as to be addressed in the treatment plan.     The patient's rehab potential is Good.   Pt would continue to benefit from skilled OT. yes     Tiara is progressing well towards her goals and there are no updates to goals at this time. Pt prognosis is Good.     Pt will continue to benefit from skilled outpatient occupational therapy to address the deficits listed in the problem list on initial evaluation provide pt/family education and to maximize pt's level of independence in the home management of this chronic condition.     Plan of care is extended to continue treatment once every other week thru 3/31/2024    Pt's spiritual, cultural and educational needs considered and pt agreeable to plan of care and goals.    Anticipated  barriers to occupational therapy: duration of lymphedema and size of the arm    Goals:   Short Term Goals: (phase 1 of protocol)  1. Patient and/or caregiver will demonstrate understanding of lymphedema precautions to decrease the risk of infection and lymphedema exacerbation. - MET 6/1/2023   2. Patient will tolerate daily activities wearing multilayered bandaging.- Ongoing 2/29/2024  3. Patient and/or caregiver will order/obtain appropriate compression garments- Ongoing 2/29/2024  4. Patient will experience a decrease in girth of affected areas of 1.0 cm- Partially met 3/12/2024      Long Term Goals for 6 weeks: (Phase 2 of goals)  1. Patient and/or caregiver will be independent with donning and doffing of compression garments.  2. Patient and/or caregiver will be independent in self-bandaging and self MLD techniques.  3. Patient and/or caregiver will be independent with HEP and lymphedema management to help prevent edema relapse and reduce risk of infection.  4. Patient will experience a decreased in girth of affected areas of 2.0 cm Partially met 3/12/2024  see girth chart     PLAN     Updated plan of care 12/12/2023:   Continue 1 time every other week thru  3/31 /24 to include the following interventions:Manual Lymphatic Drainage, short stretch compression bandaging.    Ute Schwartz OT, MEETAR

## 2024-03-26 ENCOUNTER — CLINICAL SUPPORT (OUTPATIENT)
Dept: REHABILITATION | Facility: HOSPITAL | Age: 74
End: 2024-03-26
Payer: MEDICARE

## 2024-03-26 DIAGNOSIS — I89.0 LYMPHEDEMA OF RIGHT ARM: Primary | ICD-10-CM

## 2024-03-26 PROCEDURE — 97140 MANUAL THERAPY 1/> REGIONS: CPT

## 2024-03-26 NOTE — PROGRESS NOTES
[facility]  Occupational Therapy Treatment Note    Date: 3/26/2024  Name: Tiara Rasheed  Clinic Number: [id number]    Time In: 12:30  Time Out:1:30  Total Billable Time: 60 minutes    Therapy Diagnosis:   Encounter Diagnosis   Name Primary?    Lymphedema of right arm Yes     Physician: Shiloh Tao MD  Physician Orders: OT Eval and Treat    Evaluation Date: 2/28/2023  Insurance Authorization Period Expiration: 12/31/2023  Plan of Care Expiration: 12/28/23 request extension 6/30/24  Visit # / Visits authorized 6/8  FOTO closed    Precautions:  Standard and cancer    SUBJECTIVE     Pt reports: Patient states home management includes very occasional use of the compression sleeve and recently she has increased to daily use of the compression pump.  She notes that the arm has been much softer since getting back to daily use of the pump.  She states she wears the bandages applied by therapist until the evening of therapy, but is unable to sleep in them.  Patient reports the arm fluctuates in color, sometimes very pink but not so much today.   Response to previous treatment: the RUE is soft throughout following treatment.   The pinkness in the RUE is very light this visit.   It is not painful or warm. There is a measurable decrease in the edema of the RUE over the course of the year of treatment.  Functional change: n/a    Pain: 7/10  Location: the RIGHT & LEFT LOWER EXTREMITY and R hip today. Patient denies pain in the RUE with lymphedema. She reports ongoing back/hip/leg pain managed by pain management.    OBJECTIVE     Pt arrived with no compression on the arm. She brought her bandages for use.         Objective Measures updated at progress report unless specified.    Treatment     Tiara received the treatments listed below:     Manual therapy techniques were applied for 60 minutes, including:    MANUAL LYMPHATIC DRAINAGE (MLD):  Patient had difficulty lifting the legs onto the mat  and over the wedge again this date.  While supine with LEs elevated  over wedge, stimulation performed about the lymph nodes of the head and neck.  The entire R upper quadrant received drainage. The anterior chest wall was shunted across towards the L axilla. The R  arm is full of soft pitting edema. The edema is  very soft but large in the upper arm this visit. There is notable decreased pinkness about the upper arm, lateral arm/forearm. The upper arm was treated with scooping and pumping and deep thumb [place] techniques. The cubital fossa was cleared. The forearm was treated with deep thumb [place] techniques to break down the pitting edema, as was the dorsal hand. There is moderately pitting edema throughout the lower arm. There is trace pitting over the dorsal hand this date. The digits were cleared then the entire arm was shunted up through the posterior upper arm. Patient turned to sidelying and fluid was shunted across the back towards the L axilla. The pinkness in the arm dissipates following Manual lymphatic drainage and at the end of session is notably lighter in color.    MULTILAYERED Bandaging: Patient was bandaged in stockinette, 6, 8, 10 cm rosidal short stretch compression bandages from the dorsal hand to the axilla in the usual multilayered fashion.    Patient was encouraged to continue using her home sequential compression pump daily.  Patient was encouraged to use the daytime compression sleeve.     Pt was educated in potential compression needs.       Discussed wear schedule, don/doff, wash and management of products.    Patient Education and Home Exercises      Education provided:   1. Educated on cause of lymphedema.  2. Explained the Complete Decongestive Therapy protocol in depth  3. Educated on Phase 1 and 2 of protocol.  4. Reviewed treatment frequency and likely duration of weeks  5. Precautions and contraindications for treatment.  6. Plan of care and goals.  7. Educated on home management  protocols.     Written Home Compression program Provided: Patient to have the arm in short stretch compression bandages or compression sleeve daily.         Assessment       The pinkness and size of the edema are improved but remain and will benefit from ongoing manual lymphatic drainage to facilitate lymphatic flow.  There is a measurable decrease in the size of the arm over the year.     Tiara Rasheed is a 73 y.o. female referred to outpatient occupational therapy and presents with a medical diagnosis of lymphedema secondary to breast cancer. Lymphedema, left untreated increases risk of infection, and poor body image. Patient presents with the following therapy deficits: RUE lymphedema .Following medical record review it is determined that pt will benefit from complete decongestive therapy services for the treatment and management of this chronic condition. The following goals were discussed with the patient and patient is in agreement with them as to be addressed in the treatment plan.     The patient's rehab potential is Good.   Pt would continue to benefit from skilled OT. yes     Tiara is progressing well towards her goals and there are no updates to goals at this time. Pt prognosis is Good.     Pt will continue to benefit from skilled outpatient occupational therapy to address the deficits listed in the problem list on initial evaluation provide pt/family education and to maximize pt's level of independence in the home management of this chronic condition.     Plan of care is extended to continue treatment once every other week thru 6/30/2024    Pt's spiritual, cultural and educational needs considered and pt agreeable to plan of care and goals.    Anticipated barriers to occupational therapy: duration of lymphedema and size of the arm    Goals:   Short Term Goals: (phase 1 of protocol)  1. Patient and/or caregiver will demonstrate understanding of lymphedema precautions to decrease the risk of  infection and lymphedema exacerbation. - MET 6/1/2023   2. Patient will tolerate daily activities wearing multilayered bandaging.- Ongoing 2/29/2024  3. Patient and/or caregiver will order/obtain appropriate compression garments- Ongoing 2/29/2024  4. Patient will experience a decrease in girth of affected areas of 1.0 cm- Partially met 3/12/2024      Long Term Goals for 6 weeks: (Phase 2 of goals)  1. Patient and/or caregiver will be independent with donning and doffing of compression garments.  2. Patient and/or caregiver will be independent in self-bandaging and self MLD techniques.  3. Patient and/or caregiver will be independent with HEP and lymphedema management to help prevent edema relapse and reduce risk of infection.  4. Patient will experience a decreased in girth of affected areas of 2.0 cm Partially met 3/12/2024  see girth chart     PLAN     Updated plan of care 3/26/24:   Continue 1 time every other week thru  6/30/24 to include the following interventions:Manual Lymphatic Drainage, short stretch compression bandaging.    Ute Schwartz OT, MEETAR

## 2024-04-11 ENCOUNTER — CLINICAL SUPPORT (OUTPATIENT)
Dept: REHABILITATION | Facility: HOSPITAL | Age: 74
End: 2024-04-11
Payer: MEDICARE

## 2024-04-11 DIAGNOSIS — I89.0 LYMPHEDEMA OF RIGHT ARM: Primary | ICD-10-CM

## 2024-04-11 PROCEDURE — 97140 MANUAL THERAPY 1/> REGIONS: CPT
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
## 2024-04-25 ENCOUNTER — CLINICAL SUPPORT (OUTPATIENT)
Dept: REHABILITATION | Facility: HOSPITAL | Age: 74
End: 2024-04-25
Payer: MEDICARE

## 2024-04-25 DIAGNOSIS — I89.0 LYMPHEDEMA OF RIGHT ARM: Primary | ICD-10-CM

## 2024-04-25 PROCEDURE — 97140 MANUAL THERAPY 1/> REGIONS: CPT

## 2024-04-25 NOTE — PROGRESS NOTES
[facility]  Occupational Therapy Treatment Note    Date: 4/25/2024  Name: Tiara Rasheed  Clinic Number: [id number]    Time In: 10:15  Time Out:11:00  Total Billable Time: 45 minutes  .    Therapy Diagnosis:   Encounter Diagnosis   Name Primary?    Lymphedema of right arm Yes     Physician: Shiloh Tao MD  Physician Orders: OT Eval and Treat    Evaluation Date: 2/28/2023  Insurance Authorization Period Expiration: 12/31/2024  Plan of Care Expiration: 12/28/23 request extension 6/30/24  Visit # / Visits authorized 8/8  FOTO closed    Precautions:  Standard and cancer    SUBJECTIVE     Pt reports:Patient home management includes very occasional use of the compression sleeve and daily use of the compression pump.  She notes that the arm has remained much softer since getting back to daily use of the pump. The are is also notably less pink.   She states she wears the bandages applied by therapist until the evening of therapy, but is unable to sleep in them.    Response to previous treatment: the RUE is soft throughout following treatment.   The pinkness in the RUE is very light this visit.   It is not painful or warm. There is a measurable decrease in the edema of the RUE over the course of the year of treatment.  Functional change: n/a    Pain: 7/10  Location: the RIGHT & LEFT LOWER EXTREMITY .    Patient denies pain in the RUE with lymphedema. She reports ongoing back/hip/leg pain managed by pain management.    OBJECTIVE     Pt arrived with no compression on the arm. She brought her bandages for use.            Objective Measures updated at progress report unless specified.    Treatment     Tiara received the treatments listed below:     Manual therapy techniques were applied for 45 minutes, including:    MANUAL LYMPHATIC DRAINAGE (MLD):  Patient had increased difficulty lifting the legs onto the mat and over the wedge  this date.  While supine with LEs elevated  over wedge,  stimulation performed about the lymph nodes of the head and neck.  The entire R upper quadrant received drainage. The anterior chest wall was shunted across towards the L axilla. The R  arm is full of soft pitting edema. The edema is  very soft but large in the upper arm this visit. There is notable decreased pinkness about the upper arm, lateral arm/forearm again this date so that there is almost no color in the arm.. The upper arm was treated with scooping and pumping and deep thumb [place] techniques. The cubital fossa was cleared. The forearm was treated with deep thumb [place] techniques to break down the pitting edema, as was the dorsal hand. There is moderately pitting edema throughout the lower arm. There is trace pitting over the dorsal hand this date. The digits were cleared then the entire arm was shunted up through the posterior upper arm. Patient turned to sidelying and fluid was shunted across the back towards the L axilla. The pinkness in the arm dissipates following Manual lymphatic drainage and at the end of session is notably lighter in color.    MULTILAYERED Bandaging: Patient was bandaged in stockinette, 6, 8, 10 cm rosidal short stretch compression bandages from the dorsal hand to the axilla in the usual multilayered fashion.    Patient was encouraged to continue using her home sequential compression pump daily.  Patient was encouraged to use the daytime compression sleeve.     Pt was educated in potential compression needs.       Discussed wear schedule, don/doff, wash and management of products.    Patient Education and Home Exercises      Education provided:   1. Educated on cause of lymphedema.  2. Explained the Complete Decongestive Therapy protocol in depth  3. Educated on Phase 1 and 2 of protocol.  4. Reviewed treatment frequency and likely duration of weeks  5. Precautions and contraindications for treatment.  6. Plan of care and goals.  7. Educated on home management protocols.      Written Home Compression program Provided: Patient to have the arm in short stretch compression bandages or compression sleeve daily.         Assessment       The pinkness and size of the edema are improved but remain and will benefit from ongoing manual lymphatic drainage to facilitate lymphatic flow.  There is a measurable decrease in the size of the arm over the year.     Tiara Rasheed is a 73 y.o. female referred to outpatient occupational therapy and presents with a medical diagnosis of lymphedema secondary to breast cancer. Lymphedema, left untreated increases risk of infection, and poor body image. Patient presents with the following therapy deficits: RUE lymphedema .Following medical record review it is determined that pt will benefit from complete decongestive therapy services for the treatment and management of this chronic condition. The following goals were discussed with the patient and patient is in agreement with them as to be addressed in the treatment plan.     The patient's rehab potential is Good.   Pt would continue to benefit from skilled OT. yes     Tiara is progressing well towards her goals and there are no updates to goals at this time. Pt prognosis is Good.     Pt will continue to benefit from skilled outpatient occupational therapy to address the deficits listed in the problem list on initial evaluation provide pt/family education and to maximize pt's level of independence in the home management of this chronic condition.     Plan of care is extended to continue treatment once every other week thru 6/30/2024    Pt's spiritual, cultural and educational needs considered and pt agreeable to plan of care and goals.    Anticipated barriers to occupational therapy: duration of lymphedema and size of the arm    Goals:   Short Term Goals: (phase 1 of protocol)  1. Patient and/or caregiver will demonstrate understanding of lymphedema precautions to decrease the risk of infection and  lymphedema exacerbation. - MET 6/1/2023   2. Patient will tolerate daily activities wearing multilayered bandaging.- Ongoing 4/11/2024  3. Patient and/or caregiver will order/obtain appropriate compression garments- Ongoing 4/11/2024  4. Patient will experience a decrease in girth of affected areas of 1.0 cm- Partially met 4/11/2024      Long Term Goals for 6 weeks: (Phase 2 of goals)  1. Patient and/or caregiver will be independent with donning and doffing of compression garments.  2. Patient and/or caregiver will be independent in self-bandaging and self MLD techniques.  3. Patient and/or caregiver will be independent with HEP and lymphedema management to help prevent edema relapse and reduce risk of infection.  4. Patient will experience a decreased in girth of affected areas of 2.0 cm Partially met 4/11/2024  see girth chart     PLAN     Updated plan of care 3/26/24:   Continue 1 time every other week thru  6/30/24 to include the following interventions:Manual Lymphatic Drainage, short stretch compression bandaging.    Ute Schwartz, OT, [facility]

## 2024-05-02 ENCOUNTER — CLINICAL SUPPORT (OUTPATIENT)
Dept: REHABILITATION | Facility: HOSPITAL | Age: 74
End: 2024-05-02
Payer: MEDICARE

## 2024-05-02 DIAGNOSIS — I89.0 LYMPHEDEMA OF RIGHT ARM: Primary | ICD-10-CM

## 2024-05-02 PROCEDURE — 97140 MANUAL THERAPY 1/> REGIONS: CPT

## 2024-05-02 NOTE — PROGRESS NOTES
[facility]  Occupational Therapy Treatment Note    Date: 5/2/2024  Name: Tiara Rasheed  Clinic Number: [id number]    Time In: 10:00  Time Out:11:00  Total Billable Time: 60 minutes  .    Therapy Diagnosis:   Encounter Diagnosis   Name Primary?    Lymphedema of right arm Yes     Physician: Shiloh Tao MD  Physician Orders: OT Eval and Treat    Evaluation Date: 2/28/2023  Insurance Authorization Period Expiration: 12/31/2024  Plan of Care Expiration: 12/28/23 request extension 6/30/24  Visit # / Visits authorized 9/13  FOTO closed    Precautions:  Standard and cancer    SUBJECTIVE     Pt reports:Patient  reports continued home management includes very occasional use of the compression sleeve and daily use of the compression pump.  She notes that the arm has remained much softer since getting back to daily use of the pump. The arm is also notably less pink.   She states she wears the bandages applied by therapist until the evening of therapy, but is unable to sleep in them.    Response to previous treatment: the RUE is soft throughout following treatment.   The pinkness in the RUE is very light this visit.   It is not painful or warm. There is a measurable decrease in the edema of the RUE over the course of the year of treatment.  Functional change: n/a    Pain: 7/10  Location: the RIGHT & LEFT LOWER EXTREMITY .    Patient denies pain in the RUE with lymphedema. She reports ongoing back/hip/leg pain managed by pain management.    OBJECTIVE     Pt arrived with no compression on the arm. She brought her bandages for use.            Objective Measures updated at progress report unless specified.    Treatment     Tiara received the treatments listed below:     Manual therapy techniques were applied for 60 minutes, including:    MANUAL LYMPHATIC DRAINAGE (MLD):  Patient had slightly less difficulty lifting the legs onto the mat and over the wedge  this date.  While supine with LEs  elevated  over wedge, stimulation performed about the lymph nodes of the head and neck.  The entire R upper quadrant received drainage. The anterior chest wall was shunted across towards the L axilla. The R  arm is full of soft pitting edema. The edema is  very soft but large in the upper arm this visit. There is very light  pinkness about the upper arm, lateral arm/forearm again this date so that there is almost no color in the arm.. The upper arm was treated with scooping and pumping and deep thumb [place] techniques. The cubital fossa was cleared. The forearm was treated with deep thumb [place] techniques to break down the pitting edema, as was the dorsal hand. There is moderately pitting edema throughout the lower arm. There is trace pitting over the dorsal hand this date. The digits were cleared then the entire arm was shunted up through the posterior upper arm. Patient turned to sidelying and fluid was shunted across the back towards the L axilla. The pinkness in the arm dissipates following Manual lymphatic drainage and at the end of session is notably lighter in color.    MULTILAYERED Bandaging: Patient was bandaged in stockinette, 6, 8, 10 cm rosidal short stretch compression bandages from the dorsal hand to the axilla in the usual multilayered fashion.    Patient was encouraged to continue using her home sequential compression pump daily.  Patient was encouraged to use the daytime compression sleeve.     Pt was educated in potential compression needs.       Discussed wear schedule, don/doff, wash and management of products.    Patient Education and Home Exercises      Education provided:   1. Educated on cause of lymphedema.  2. Explained the Complete Decongestive Therapy protocol in depth  3. Educated on Phase 1 and 2 of protocol.  4. Reviewed treatment frequency and likely duration of weeks  5. Precautions and contraindications for treatment.  6. Plan of care and goals.  7. Educated on home management  protocols.     Written Home Compression program Provided: Patient to have the arm in short stretch compression bandages or compression sleeve daily.         Assessment       The pinkness and size of the edema are improved but remain and will benefit from ongoing manual lymphatic drainage to facilitate lymphatic flow.  There is a measurable decrease in the size of the arm over the year.     Taira Rasheed is a 73 y.o. female referred to outpatient occupational therapy and presents with a medical diagnosis of lymphedema secondary to breast cancer. Lymphedema, left untreated increases risk of infection, and poor body image. Patient presents with the following therapy deficits: RUE lymphedema .Following medical record review it is determined that pt will benefit from complete decongestive therapy services for the treatment and management of this chronic condition. The following goals were discussed with the patient and patient is in agreement with them as to be addressed in the treatment plan.     The patient's rehab potential is Good.   Pt would continue to benefit from skilled OT. yes     Tiara is progressing well towards her goals and there are no updates to goals at this time. Pt prognosis is Good.     Pt will continue to benefit from skilled outpatient occupational therapy to address the deficits listed in the problem list on initial evaluation provide pt/family education and to maximize pt's level of independence in the home management of this chronic condition.     Plan of care is extended to continue treatment once every other week thru 6/30/2024    Pt's spiritual, cultural and educational needs considered and pt agreeable to plan of care and goals.    Anticipated barriers to occupational therapy: duration of lymphedema and size of the arm    Goals:   Short Term Goals: (phase 1 of protocol)  1. Patient and/or caregiver will demonstrate understanding of lymphedema precautions to decrease the risk of  infection and lymphedema exacerbation. - MET 6/1/2023   2. Patient will tolerate daily activities wearing multilayered bandaging.- Ongoing 5/2/2024  3. Patient and/or caregiver will order/obtain appropriate compression garments- Ongoing 5/2/2024  4. Patient will experience a decrease in girth of affected areas of 1.0 cm- Partially met 4/11/2024      Long Term Goals for 6 weeks: (Phase 2 of goals)  1. Patient and/or caregiver will be independent with donning and doffing of compression garments.  2. Patient and/or caregiver will be independent in self-bandaging and self MLD techniques.  3. Patient and/or caregiver will be independent with HEP and lymphedema management to help prevent edema relapse and reduce risk of infection.  4. Patient will experience a decreased in girth of affected areas of 2.0 cm Partially met 4/11/2024  see girth chart     PLAN     Updated plan of care 3/26/24:   Continue 1 time every other week thru  6/30/24 to include the following interventions:Manual Lymphatic Drainage, short stretch compression bandaging.    Ute Schwartz OT, [facility]

## 2024-05-16 ENCOUNTER — CLINICAL SUPPORT (OUTPATIENT)
Dept: REHABILITATION | Facility: HOSPITAL | Age: 74
End: 2024-05-16
Payer: MEDICARE

## 2024-05-16 DIAGNOSIS — I89.0 LYMPHEDEMA OF RIGHT ARM: Primary | ICD-10-CM

## 2024-05-16 PROCEDURE — 97140 MANUAL THERAPY 1/> REGIONS: CPT

## 2024-05-16 NOTE — PROGRESS NOTES
"  [facility]  Occupational Therapy Treatment Note    Date: 5/16/2024  Name: Tiara Rasheed  Clinic Number: [id number]    Time In: 11:00  Time Out:12:00  Total Billable Time: 60 minutes  .    Therapy Diagnosis:   Encounter Diagnosis   Name Primary?    Lymphedema of right arm Yes     Physician: Shiloh Tao MD  Physician Orders: OT Eval and Treat    Evaluation Date: 2/28/2023  Insurance Authorization Period Expiration: 12/31/2024  Plan of Care Expiration: 12/28/23 request extension 6/30/24  Visit # / Visits authorized 10/13  FOTO closed    Precautions:  Standard and cancer    SUBJECTIVE     Pt reports:Patient complains of increased thickness about the R lateral chest wall she feels is coming "from the arm".  Pt.  reports continued home management includes very occasional use of the compression sleeve and daily use of the compression pump.  She notes that the arm has remained much softer since getting back to daily use of the pump. The arm is also notably less pink.   She states she wears the bandages applied by therapist until the evening of therapy, but is unable to sleep in them.    Response to previous treatment: the RUE is soft throughout following treatment.   The pinkness in the RUE is not notable this visit.  The RUE  is not painful or warm. There is a measurable decrease in the edema of the RUE over the course of the year of treatment.  Functional change: n/a    Pain: 7/10  Location: the RIGHT & LEFT LOWER EXTREMITY .    Patient denies pain in the RUE with lymphedema. She reports ongoing back/hip/leg pain managed by pain management.    OBJECTIVE     Pt arrived with no compression on the arm. She brought her bandages for use.     UE Girth Measurements:  [place] RIGHT UE  2/28/23 RIGHT UE  5/16/24 change             E + 15cm 36.0 cm 34.0 cm -2.0 cm   E + 10cm 39.0 cm 37.0 cm -2.0cm   Elbow 35.5 cm 33.0 cm -2.5 cm             W + 15cm 36.0 cm 34.0 cm -2.0 cm   W + 10cm 31.0 cm 30.5 " cm -0.5 cm   Wrist 20.0 cm 19.5 cm -0.5 cm   DPC 19.5 cm 18.4 cm -1.1cm   IP 6.0 cm 5.7cm -0.3cm            Objective Measures updated at progress report unless specified.    Treatment     Tiara received the treatments listed below:     Manual therapy techniques were applied for 60 minutes, including:    MANUAL LYMPHATIC DRAINAGE (MLD):  Patient had mild difficulty lifting the legs onto the mat and over the wedge  this date.  While supine with LEs elevated  over wedge, stimulation performed about the lymph nodes of the head and neck.  The entire R upper quadrant received drainage. The anterior chest wall was shunted across towards the L axilla. There is a large area filling in the lateral chest wall above the bra line of thick pitting edema.  The R  arm is full of soft pitting edema. The edema is  very soft but large in the upper arm this visit. There is no notable pinkness about the upper arm, lateral arm/forearm  this date so that there is almost no color in the arm.. The upper arm was treated with scooping and pumping and deep thumb [place] techniques. The cubital fossa was cleared. The forearm was treated with deep thumb [place] techniques to break down the pitting edema, as was the dorsal hand. There is moderately pitting edema throughout the lower arm. There is trace pitting over the dorsal hand this date. The digits were cleared then the entire arm was shunted up through the posterior upper arm. Patient turned to sidelying, deep thumb [place] techniques were performed about the new thick pitting edema in the lateral chest. This softened well and quickly  and fluid was shunted across the back towards the L axilla.     MULTILAYERED Bandaging: Patient was bandaged in stockinette, 6, 8, 10 cm rosidal short stretch compression bandages from the dorsal hand to the axilla in the usual multilayered fashion.    Patient was encouraged to continue using her home sequential compression pump daily.  Patient was  encouraged to use the daytime compression sleeve.     Pt was educated in potential compression needs.       Discussed wear schedule, don/doff, wash and management of products.    Patient Education and Home Exercises      Education provided:   1. Educated on cause of lymphedema.  2. Explained the Complete Decongestive Therapy protocol in depth  3. Educated on Phase 1 and 2 of protocol.  4. Reviewed treatment frequency and likely duration of weeks  5. Precautions and contraindications for treatment.  6. Plan of care and goals.  7. Educated on home management protocols.     Written Home Compression program Provided: Patient to have the arm in short stretch compression bandages or compression sleeve daily.         Assessment       The pinkness and size of the edema are improved but remain and will benefit from ongoing manual lymphatic drainage to facilitate lymphatic flow.  There is a measurable decrease in the size of the arm over the year.     Tiara Rasheed is a 73 y.o. female referred to outpatient occupational therapy and presents with a medical diagnosis of lymphedema secondary to breast cancer. Lymphedema, left untreated increases risk of infection, and poor body image. Patient presents with the following therapy deficits: RUE lymphedema .Following medical record review it is determined that pt will benefit from complete decongestive therapy services for the treatment and management of this chronic condition. The following goals were discussed with the patient and patient is in agreement with them as to be addressed in the treatment plan.     The patient's rehab potential is Good.   Pt would continue to benefit from skilled OT. yes     Tiara is progressing well towards her goals and there are no updates to goals at this time. Pt prognosis is Good.     Pt will continue to benefit from skilled outpatient occupational therapy to address the deficits listed in the problem list on initial evaluation provide  pt/family education and to maximize pt's level of independence in the home management of this chronic condition.     Plan of care is extended to continue treatment once every other week thru 6/30/2024    Pt's spiritual, cultural and educational needs considered and pt agreeable to plan of care and goals.    Anticipated barriers to occupational therapy: duration of lymphedema and size of the arm    Goals:   Short Term Goals: (phase 1 of protocol)  1. Patient and/or caregiver will demonstrate understanding of lymphedema precautions to decrease the risk of infection and lymphedema exacerbation. - MET 6/1/2023   2. Patient will tolerate daily activities wearing multilayered bandaging.- Ongoing 5/2/2024  3. Patient and/or caregiver will order/obtain appropriate compression garments- Ongoing 5/2/2024  4. Patient will experience a decrease in girth of affected areas of 1.0 cm- Partially met 5/16/2024      Long Term Goals for 6 weeks: (Phase 2 of goals)  1. Patient and/or caregiver will be independent with donning and doffing of compression garments.  2. Patient and/or caregiver will be independent in self-bandaging and self MLD techniques.  3. Patient and/or caregiver will be independent with HEP and lymphedema management to help prevent edema relapse and reduce risk of infection.  4. Patient will experience a decreased in girth of affected areas of 2.0 cm Partially met 5/16/2024  see girth chart     PLAN     Updated plan of care 3/26/24:   Continue 1 time every other week thru  6/30/24 to include the following interventions:Manual Lymphatic Drainage, short stretch compression bandaging.    Ute Schwartz OT, SOFIA

## 2024-06-06 ENCOUNTER — CLINICAL SUPPORT (OUTPATIENT)
Dept: REHABILITATION | Facility: HOSPITAL | Age: 74
End: 2024-06-06
Payer: MEDICARE

## 2024-06-06 DIAGNOSIS — I89.0 LYMPHEDEMA OF RIGHT ARM: Primary | ICD-10-CM

## 2024-06-06 PROCEDURE — 97140 MANUAL THERAPY 1/> REGIONS: CPT

## 2024-06-06 NOTE — PROGRESS NOTES
"  [facility]  Occupational Therapy Treatment Note    Date: 6/6/2024  Name: Tiara Rasheed  Clinic Number: [id number]    Time In: 11:00  Time Out:12:00  Total Billable Time: 60 minutes  .    Therapy Diagnosis:   Encounter Diagnosis   Name Primary?    Lymphedema of right arm Yes     Physician: Shiloh Tao MD  Physician Orders: OT Eval and Treat    Evaluation Date: 2/28/2023  Insurance Authorization Period Expiration: 12/31/2024  Plan of Care Expiration: 12/28/23 request extension 6/30/24  Visit # / Visits authorized 11/13  FOTO closed    Precautions:  Standard and cancer    SUBJECTIVE     Pt reports:Patient complains of increased thickness about the R lateral chest wall / side she feels is coming "from the arm".  Pt.  reports continued home management includes very occasional use of the compression sleeve and daily use of the compression pump.  She notes that the arm has remained much softer since getting back to daily use of the pump. The arm is also notably less pink.   She states she wears the bandages applied by therapist until the evening of therapy, but is unable to sleep in them.    Response to previous treatment: the RUE is soft throughout following treatment.   The pinkness in the RUE is not notable this visit.  The RUE  is not painful or warm. There is a measurable decrease in the edema of the RUE over the course of the year of treatment.  Functional change: n/a    Pain: 7/10  Location: the RIGHT & LEFT LOWER EXTREMITY .    Patient denies pain in the RUE with lymphedema. She reports ongoing back/hip/leg pain managed by pain management.    OBJECTIVE     Pt arrived with no compression on the arm. She brought her bandages for use.     Objective Measures updated at progress report unless specified.    Treatment     Tiara received the treatments listed below:     Manual therapy techniques were applied for 60 minutes, including:    MANUAL LYMPHATIC DRAINAGE (MLD):  Patient had " somewhat less difficulty lifting the legs onto the mat and over the wedge  this date.  While supine with LEs elevated  over wedge, stimulation performed about the lymph nodes of the head and neck.  The entire R upper quadrant received drainage. The anterior chest wall was shunted across towards the L axilla. There is a large area filling in the lateral chest wall above the bra line of thick pitting edema.  The R  arm is full of soft pitting edema. The edema is  very soft but large in the upper arm this visit. There is no notable pinkness about the upper arm, lateral arm/forearm  again this date so that there is almost no color in the arm.. The upper arm was treated with scooping and pumping and deep thumb [place] techniques. The cubital fossa was cleared. The forearm was treated with deep thumb [place] techniques to break down the pitting edema, as was the dorsal hand. There is moderately pitting edema throughout the lower arm. There is trace pitting over the dorsal hand this date. The digits were cleared then the entire arm was shunted up through the posterior upper arm. Patient turned to sidelying, deep thumb [place] techniques were performed about the persistent thick pitting edema in the lateral chest. This softened well and quickly  and fluid was shunted across the back towards the L axilla.     MULTILAYERED Bandaging: Patient was bandaged in stockinette, 6, 8, 10 cm rosidal short stretch compression bandages from the dorsal hand to the axilla in the usual multilayered fashion.    Patient was encouraged to continue using her home sequential compression pump daily.  Patient was encouraged to use the daytime compression sleeve.     Pt was educated in potential compression needs.       Discussed wear schedule, don/doff, wash and management of products.    Patient Education and Home Exercises      Education provided:   1. Educated on cause of lymphedema.  2. Explained the Complete Decongestive Therapy protocol in  depth  3. Educated on Phase 1 and 2 of protocol.  4. Reviewed treatment frequency and likely duration of weeks  5. Precautions and contraindications for treatment.  6. Plan of care and goals.  7. Educated on home management protocols.     Written Home Compression program Provided: Patient to have the arm in short stretch compression bandages or compression sleeve daily.         Assessment       The pinkness and size of the edema are improved but remain and will benefit from ongoing manual lymphatic drainage to facilitate lymphatic flow.  There is a measurable decrease in the size of the arm over the year.     Tiara Rasheed is a 73 y.o. female referred to outpatient occupational therapy and presents with a medical diagnosis of lymphedema secondary to breast cancer. Lymphedema, left untreated increases risk of infection, and poor body image. Patient presents with the following therapy deficits: RUE lymphedema .Following medical record review it is determined that pt will benefit from complete decongestive therapy services for the treatment and management of this chronic condition. The following goals were discussed with the patient and patient is in agreement with them as to be addressed in the treatment plan.     The patient's rehab potential is Good.   Pt would continue to benefit from skilled OT. yes     Tiara is progressing well towards her goals and there are no updates to goals at this time. Pt prognosis is Good.     Pt will continue to benefit from skilled outpatient occupational therapy to address the deficits listed in the problem list on initial evaluation provide pt/family education and to maximize pt's level of independence in the home management of this chronic condition.     Plan of care is extended to continue treatment once every other week thru 6/30/2024    Pt's spiritual, cultural and educational needs considered and pt agreeable to plan of care and goals.    Anticipated barriers to  occupational therapy: duration of lymphedema and size of the arm    Goals:   Short Term Goals: (phase 1 of protocol)  1. Patient and/or caregiver will demonstrate understanding of lymphedema precautions to decrease the risk of infection and lymphedema exacerbation. - MET 6/1/2023   2. Patient will tolerate daily activities wearing multilayered bandaging.- Ongoing 5/2/2024  3. Patient and/or caregiver will order/obtain appropriate compression garments- Ongoing 5/2/2024  4. Patient will experience a decrease in girth of affected areas of 1.0 cm- Partially met 5/16/2024      Long Term Goals for 6 weeks: (Phase 2 of goals)  1. Patient and/or caregiver will be independent with donning and doffing of compression garments.  2. Patient and/or caregiver will be independent in self-bandaging and self MLD techniques.  3. Patient and/or caregiver will be independent with HEP and lymphedema management to help prevent edema relapse and reduce risk of infection.  4. Patient will experience a decreased in girth of affected areas of 2.0 cm Partially met 5/16/2024  see girth chart     PLAN     Updated plan of care 3/26/24:   Continue 1 time every 3 weeks thru  6/30/24 to include the following interventions:Manual Lymphatic Drainage, short stretch compression bandaging.    Ute Schwartz OT, ME[facility]

## 2024-06-27 ENCOUNTER — CLINICAL SUPPORT (OUTPATIENT)
Dept: REHABILITATION | Facility: HOSPITAL | Age: 74
End: 2024-06-27
Payer: MEDICARE

## 2024-06-27 DIAGNOSIS — I89.0 LYMPHEDEMA OF RIGHT ARM: Primary | ICD-10-CM

## 2024-06-27 PROCEDURE — 97140 MANUAL THERAPY 1/> REGIONS: CPT

## 2024-06-27 NOTE — PROGRESS NOTES
[facility]  Occupational Therapy Treatment Note    Date: 6/27/2024  Name: Tiara Rasheed  [facility] Number: [id number]    Time In: 11:00  Time Out:12:00  Total Billable Time: 60 minutes  .    Therapy Diagnosis:   Encounter Diagnosis   Name Primary?    Lymphedema of right arm Yes     Physician: Shiloh Tao MD  Physician Orders: OT Eval and Treat    Evaluation Date: 2/28/2023  Insurance Authorization Period Expiration: 12/31/2024  Plan of Care Expiration: 12/28/23 request extension 6/30/24  Visit # / Visits authorized 12/13  FOTO closed    Precautions:  Standard and cancer    SUBJECTIVE     Pt reports:Patient states she had a visit with Dr. Tao this week who recommends continued therapy and is sending over a new Rx.  She notes that the arm has remained much softer since getting back to daily use of the pump. The arm is also notably less pink.   She states she wears the bandages applied by therapist until the evening of therapy, but is unable to sleep in them.    Response to previous treatment: the RUE is soft throughout following treatment.   The pinkness in the RUE is not notable this visit.  The RUE  is not painful or warm. There is a measurable decrease in the edema of the RUE over the course of the year of treatment.  Functional change: n/a    Pain: 7/10  Location: the RIGHT & LEFT LOWER EXTREMITY .    Patient denies pain in the RUE with lymphedema. She reports ongoing back/hip/leg pain managed by pain management.    OBJECTIVE     Pt arrived with no compression on the arm. She brought her bandages for use.     UE Girth Measurements:  [place] RIGHT UE  2/28/23 RIGHT UE  6/27/24 change             E + 15cm 36.0 cm 34.5 cm -1.5 cm   E + 10cm 39.0 cm 37.5 cm -1.5cm   Elbow 35.5 cm 33.5cm -2.0 cm             W + 15cm 36.0 cm 34.0 cm -2.0 cm   W + 10cm 31.0 cm 30.5 cm -0.5 cm   Wrist 20.0 cm 19.5 cm -0.5 cm   DPC 19.5 cm 18.5cm -1.0cm   IP 6.0 cm 6.0cm -0.0cm            Objective  Measures updated at progress report unless specified.    Treatment     Tiara received the treatments listed below:     Manual therapy techniques were applied for 60 minutes, including:    MANUAL LYMPHATIC DRAINAGE (MLD):  Patient had mild difficulty lifting the legs onto the mat and over the wedge  this date.  While supine with LEs elevated  over wedge, stimulation performed about the lymph nodes of the head and neck.  The entire R upper quadrant received drainage. The anterior chest wall was shunted across towards the L axilla. There is a small area filling in the lateral chest wall above the bra line of thick pitting edema.  The R  arm is full of soft pitting edema. The edema is  very soft but large in the upper arm this visit. There is no notable pinkness about the upper arm, lateral arm/forearm  this date so that there is almost no color in the arm.. The upper arm was treated with scooping and pumping and deep thumb [place] techniques. The cubital fossa was cleared. The forearm was treated with deep thumb [place] techniques to break down the pitting edema, as was the dorsal hand. There is moderately pitting edema throughout the lower arm. There is trace pitting over the dorsal hand this date. The digits were cleared then the entire arm was shunted up through the posterior upper arm. Patient turned to sidelying, deep thumb [place] techniques were performed about the new thick pitting edema in the lateral chest. This softened well and quickly  and fluid was shunted across the back towards the L axilla.     MULTILAYERED Bandaging: Patient was bandaged in stockinette, 6, 8, 10 cm rosidal short stretch compression bandages from the dorsal hand to the axilla in the usual multilayered fashion.    Patient was encouraged to continue using her home sequential compression pump daily.  Patient was encouraged to use the daytime compression sleeve.     Pt was educated in potential compression needs.       Discussed wear  schedule, don/doff, wash and management of products.    Patient Education and Home Exercises      Education provided:   1. Educated on cause of lymphedema.  2. Explained the Complete Decongestive Therapy protocol in depth  3. Educated on Phase 1 and 2 of protocol.  4. Reviewed treatment frequency and likely duration of weeks  5. Precautions and contraindications for treatment.  6. Plan of care and goals.  7. Educated on home management protocols.     Written Home Compression program Provided: Patient to have the arm in short stretch compression bandages or compression sleeve daily.         Assessment       The pinkness and size of the edema are improved but remain and will benefit from ongoing manual lymphatic drainage to facilitate lymphatic flow.  There is a measurable decrease in the size of the arm over the year.     Tiara Rasheed is a 73 y.o. female referred to outpatient occupational therapy and presents with a medical diagnosis of lymphedema secondary to breast cancer. Lymphedema, left untreated increases risk of infection, and poor body image. Patient presents with the following therapy deficits: RUE lymphedema .Following medical record review it is determined that pt will benefit from complete decongestive therapy services for the treatment and management of this chronic condition. The following goals were discussed with the patient and patient is in agreement with them as to be addressed in the treatment plan.     The patient's rehab potential is Good.   Pt would continue to benefit from skilled OT. yes     Tiara is progressing well towards her goals and there are no updates to goals at this time. Pt prognosis is Good.     Pt will continue to benefit from skilled outpatient occupational therapy to address the deficits listed in the problem list on initial evaluation provide pt/family education and to maximize pt's level of independence in the home management of this chronic condition.     Plan of  care is extended to continue treatment once every other week thru 6/30/2024    Pt's spiritual, cultural and educational needs considered and pt agreeable to plan of care and goals.    Anticipated barriers to occupational therapy: duration of lymphedema and size of the arm    Goals:   Short Term Goals: (phase 1 of protocol)  1. Patient and/or caregiver will demonstrate understanding of lymphedema precautions to decrease the risk of infection and lymphedema exacerbation. - MET 6/1/2023   2. Patient will tolerate daily activities wearing multilayered bandaging.- Ongoing 5/2/2024  3. Patient and/or caregiver will order/obtain appropriate compression garments- Ongoing 5/2/2024  4. Patient will experience a decrease in girth of affected areas of 1.0 cm- Partially met 5/16/2024      Long Term Goals for 6 weeks: (Phase 2 of goals)  1. Patient and/or caregiver will be independent with donning and doffing of compression garments.  2. Patient and/or caregiver will be independent in self-bandaging and self MLD techniques.  3. Patient and/or caregiver will be independent with HEP and lymphedema management to help prevent edema relapse and reduce risk of infection.  4. Patient will experience a decreased in girth of affected areas of 2.0 cm Partially met 6/27/2024  see girth chart     PLAN     Updated plan of care 3/26/24:   Continue 1 time every other week thru  6/30/24 to include the following interventions:Manual Lymphatic Drainage, short stretch compression bandaging.  Await continuation orders from Dr. Tao.    Ute Schwartz OT, SOFIA

## 2024-07-18 ENCOUNTER — CLINICAL SUPPORT (OUTPATIENT)
Dept: REHABILITATION | Facility: HOSPITAL | Age: 74
End: 2024-07-18
Payer: MEDICARE

## 2024-07-18 DIAGNOSIS — I89.0 LYMPHEDEMA OF RIGHT ARM: Primary | ICD-10-CM

## 2024-07-18 PROCEDURE — 97140 MANUAL THERAPY 1/> REGIONS: CPT

## 2024-07-18 NOTE — PROGRESS NOTES
[facility]  Occupational Therapy Treatment Note    Date: 7/18/2024  Name: Tiara Rasheed  Clinic Number: [id number]    Time In: 11:00  Time Out:12:00  Total Billable Time: 60 minutes  .    Therapy Diagnosis:   Encounter Diagnosis   Name Primary?    Lymphedema of right arm Yes     Physician: Shiloh Tao MD  Physician Orders: OT Eval and Treat    Evaluation Date: 2/28/2023  Insurance Authorization Period Expiration: 12/31/2024  Plan of Care Expiration: NEW PLAN OF CARE 7/18/24-10/9/24 2-3 visits per month  Visit # / Visits authorized 13/13  FOTO closed    Precautions:  Standard and cancer    SUBJECTIVE     Pt reports:Patient states  Dr. Tao recommends continued therapy and is sending over a new Rx.  She notes that the arm has remained much softer since getting back to daily use of the pump. The arm is also notably less pink.   She states she wears the bandages applied by therapist until the evening of therapy, but is unable to sleep in them.    Response to previous treatment: the RUE is soft throughout following treatment.   The pinkness in the RUE is not notable this visit.  The RUE  is not painful or warm. There is a measurable decrease in the edema of the RUE over the course of the year of treatment.  Functional change: n/a    Pain: 7/10  Location: the RIGHT & LEFT LOWER EXTREMITY .    Patient denies pain in the RUE with lymphedema. She reports ongoing back/hip/leg pain managed by pain management.    OBJECTIVE     Pt arrived with no compression on the arm. She brought her bandages for use.     Objective Measures updated at progress report unless specified.    Treatment     Tiara received the treatments listed below:     Manual therapy techniques were applied for 60 minutes, including:    MANUAL LYMPHATIC DRAINAGE (MLD):  Patient had less difficulty lifting the legs onto the mat and over the wedge  this date.  While supine with LEs elevated  over wedge, stimulation  performed about the lymph nodes of the head and neck.  The entire R upper quadrant received drainage. The anterior chest wall was shunted across towards the L axilla. There is a small area filling in the lateral chest wall above the bra line of thick pitting edema.  The R  arm is full of soft pitting edema. The edema is  very soft but large in the upper arm this visit. There is no notable pinkness about the upper arm, lateral arm/forearm  this date so that there is almost no color in the arm.The upper arm was treated with scooping and pumping and deep thumb [place] techniques. The cubital fossa was cleared. The forearm was treated with deep thumb [place] techniques to break down the pitting edema, as was the dorsal hand. There is moderately pitting edema throughout the lower arm. There is trace pitting over the dorsal hand this date. The digits were cleared then the entire arm was shunted up through the posterior upper arm. Patient turned to sidelying, deep thumb [place] techniques were performed about the new thick pitting edema in the lateral chest. This softened well and quickly  and fluid was shunted across the back towards the L axilla.     MULTILAYERED Bandaging: Patient was bandaged in stockinette, 6, 8, 10 cm rosidal short stretch compression bandages from the dorsal hand to the axilla in the usual multilayered fashion.    Patient was encouraged to continue using her home sequential compression pump daily.  Patient was encouraged to use the daytime compression sleeve.     Pt was educated in potential compression needs.       Discussed wear schedule, don/doff, wash and management of products.    Patient Education and Home Exercises      Education provided:   1. Educated on cause of lymphedema.  2. Explained the Complete Decongestive Therapy protocol in depth  3. Educated on Phase 1 and 2 of protocol.  4. Reviewed treatment frequency and likely duration of weeks  5. Precautions and contraindications for  treatment.  6. Plan of care and goals.  7. Educated on home management protocols.     Written Home Compression program Provided: Patient to have the arm in short stretch compression bandages or compression sleeve daily.         Assessment       The pinkness and size of the edema are improved but remain and will benefit from ongoing manual lymphatic drainage to facilitate lymphatic flow.  There is a measurable decrease in the size of the arm over the year.     Tiara Rasheed is a 73 y.o. female referred to outpatient occupational therapy and presents with a medical diagnosis of lymphedema secondary to breast cancer. Lymphedema, left untreated increases risk of infection, and poor body image. Patient presents with the following therapy deficits: RUE lymphedema .Following medical record review it is determined that pt will benefit from complete decongestive therapy services for the treatment and management of this chronic condition. The following goals were discussed with the patient and patient is in agreement with them as to be addressed in the treatment plan.     The patient's rehab potential is Good.   Pt would continue to benefit from skilled OT. yes     Tiara is progressing well towards her goals and there are no updates to goals at this time. Pt prognosis is Good.     Pt will continue to benefit from skilled outpatient occupational therapy to address the deficits listed in the problem list on initial evaluation provide pt/family education and to maximize pt's level of independence in the home management of this chronic condition.     Plan of care is extended to continue treatment 2-3 times per month thru 10/9/24    Pt's spiritual, cultural and educational needs considered and pt agreeable to plan of care and goals.    Anticipated barriers to occupational therapy: duration of lymphedema and size of the arm    Goals:   Short Term Goals: (phase 1 of protocol)  1. Patient and/or caregiver will demonstrate  understanding of lymphedema precautions to decrease the risk of infection and lymphedema exacerbation. - MET 6/1/2023   2. Patient will tolerate daily activities wearing multilayered bandaging.- Ongoing 7/18/2024  3. Patient and/or caregiver will order/obtain appropriate compression garments- Ongoing 7/18/242024  4. Patient will experience a decrease in girth of affected areas of 1.0 cm- Partially met 5/16/2024      Long Term Goals for 6 weeks: (Phase 2 of goals)  1. Patient and/or caregiver will be independent with donning and doffing of compression garments.  2. Patient and/or caregiver will be independent in self-bandaging and self MLD techniques.  3. Patient and/or caregiver will be independent with HEP and lymphedema management to help prevent edema relapse and reduce risk of infection.  4. Patient will experience a decreased in girth of affected areas of 2.0 cm Partially met 6/27/2024  see girth chart     PLAN     Updated plan of care 7/18/24   Continue 2-3 times per month thru  10/9/24 to include the following interventions:Manual Lymphatic Drainage, short stretch compression bandaging.  Await continuation orders from Dr. Tao.    Ute Schwartz, OT, SOFIA

## 2024-09-10 ENCOUNTER — CLINICAL SUPPORT (OUTPATIENT)
Dept: REHABILITATION | Facility: HOSPITAL | Age: 74
End: 2024-09-10
Payer: MEDICARE

## 2024-09-10 DIAGNOSIS — I89.0 LYMPHEDEMA OF RIGHT ARM: Primary | ICD-10-CM

## 2024-09-10 PROCEDURE — 97140 MANUAL THERAPY 1/> REGIONS: CPT

## 2024-09-11 NOTE — PROGRESS NOTES
[facility]  Occupational Therapy Treatment Note    Date: 9/10/2024  Name: Tiara Rasheed  [facility] Number: [id number]    Time In: 1:30  Time Out: 2:30  Total Billable Time: 60 minutes  .    Therapy Diagnosis:   Encounter Diagnosis   Name Primary?    Lymphedema of right arm Yes     Physician: Shiloh Tao MD  Physician Orders: OT Eval and Treat    Evaluation Date: 2/28/2023  Insurance Authorization Period Expiration: 12/31/2024  Plan of Care Expiration: NEW PLAN OF CARE 7/18/24-10/9/24 2-3 visits per month  Patient has not been able to return since 7/18/24 awaiting insurance authorization.  Visit # / Visits authorized 14/20  FOTO closed    Precautions:  Standard and cancer    SUBJECTIVE     Pt reports:Patient states the RUE home management has included use of sequential compression pump several days per week for an hour and occasional use of the daytime compression sleeve. She states she wears the bandages applied by therapist until the evening of therapy, but is unable to sleep in them.    Response to previous treatment: the RUE is soft throughout following treatment.   The pinkness in the RUE is not notable this visit.  The RUE  is not painful or warm. There is a measurable decrease in the edema of the RUE over the course of the year of treatment.  Functional change: n/a    Pain: 7/10  Location: the RIGHT & LEFT LOWER EXTREMITY .    Patient denies pain in the RUE with lymphedema. She reports ongoing back/hip/leg pain managed by pain management.    OBJECTIVE     Pt arrived with no compression on the arm. She brought her bandages for use.  UE Girth Measurements:  LANDMARK RIGHT UE  2/28/23 RIGHT UE  9/10/24 change             E + 15cm 36.0 cm 34.0 cm -2.0 cm   E + 10cm 39.0 cm 37.0 cm -2.0cm   Elbow 35.5 cm 33.0 cm -2.5 cm             W + 15cm 36.0 cm 33.0 cm -3.0 cm   W + 10cm 31.0 cm 30.0 cm -1.0 cm   Wrist 20.0 cm 19.0 cm -1.0 cm   DPC 19.5 cm 19.0 cm -0.5 cm   IP 6.0 cm 5.5 cm  -0.5 cm        Objective Measures updated at progress report unless specified.    Treatment     Tiara received the treatments listed below:     Manual therapy techniques were applied for 60 minutes, including:    MANUAL LYMPHATIC DRAINAGE (MLD):  Patient had less difficulty lifting the legs onto the mat and over the wedge this date.  While supine with LEs elevated  over wedge, stimulation performed about the lymph nodes of the head and neck.  The entire R upper quadrant received drainage. The anterior chest wall was shunted across towards the L axilla. There is a small area filling in the lateral chest wall above the bra line of thick pitting edema.  This is uncomfortable with treatment.  The R  arm is full of soft pitting edema. The edema is  very soft but large in the upper arm. There is no notable pinkness about the upper arm, lateral arm/forearm  this date so that there is almost no color in the arm.The upper arm was treated with scooping and pumping and deep thumb [place] techniques. The cubital fossa was cleared. The forearm was treated with deep thumb [place] techniques to break down the pitting edema, as was the dorsal hand. There is moderately pitting edema throughout the lower arm, most notably along the lateral ridge. There is trace pitting over the dorsal hand this date. The digits were cleared then the entire arm was shunted up through the posterior upper arm. Patient then was assisted into sitting, deep thumb [place] techniques were performed about the thick pitting edema in the lateral chest. This softened well and quickly  and fluid was shunted across the back towards the L axilla.     MULTILAYERED Bandaging: Patient was bandaged in stockinette, 6, 8, 10 cm rosidal short stretch compression bandages from the dorsal hand to the axilla in the usual multilayered fashion.    Patient was encouraged to continue using her home sequential compression pump daily.  Patient was encouraged to use the daytime  compression sleeve.     Pt was educated in potential compression needs.       Discussed wear schedule, don/doff, wash and management of products.    Patient Education and Home Exercises      Education provided:   1. Educated on cause of lymphedema.  2. Explained the Complete Decongestive Therapy protocol in depth  3. Educated on Phase 1 and 2 of protocol.  4. Reviewed treatment frequency and likely duration of weeks  5. Precautions and contraindications for treatment.  6. Plan of care and goals.  7. Educated on home management protocols.     Written Home Compression program Provided: Patient to have the arm in short stretch compression bandages or compression sleeve daily.         Assessment       The pinkness and size of the edema are improved but remain and will benefit from ongoing manual lymphatic drainage to facilitate lymphatic flow.  There is a measurable decrease in the size of the arm over the year.     Tiara Rasheed is a 73 y.o. female referred to outpatient occupational therapy and presents with a medical diagnosis of lymphedema secondary to breast cancer. Lymphedema, left untreated increases risk of infection, and poor body image. Patient presents with the following therapy deficits: RUE lymphedema .Following medical record review it is determined that pt will benefit from complete decongestive therapy services for the treatment and management of this chronic condition. The following goals were discussed with the patient and patient is in agreement with them as to be addressed in the treatment plan.     The patient's rehab potential is Good.   Pt would continue to benefit from skilled OT. yes     Tiara is progressing well towards her goals and there are no updates to goals at this time. Pt prognosis is Good.     Pt will continue to benefit from skilled outpatient occupational therapy to address the deficits listed in the problem list on initial evaluation provide pt/family education and to maximize  pt's level of independence in the home management of this chronic condition.     Plan of care is extended to continue treatment 2-3 times per month thru 10/9/24 ** Patient has not been seen since 7/24 awaiting insurance authorization.    Pt's spiritual, cultural and educational needs considered and pt agreeable to plan of care and goals.    Anticipated barriers to occupational therapy: duration of lymphedema and size of the arm    Goals:   Short Term Goals: (phase 1 of protocol)  1. Patient and/or caregiver will demonstrate understanding of lymphedema precautions to decrease the risk of infection and lymphedema exacerbation. - MET 6/1/2023   2. Patient will tolerate daily activities wearing multilayered bandaging.- Ongoing 7/18/2024  3. Patient and/or caregiver will order/obtain appropriate compression garments- Ongoing 7/18/242024  4. Patient will experience a decrease in girth of affected areas of 1.0 cm- Partially met 5/16/2024      Long Term Goals for 6 weeks: (Phase 2 of goals)  1. Patient and/or caregiver will be independent with donning and doffing of compression garments.  2. Patient and/or caregiver will be independent in self-bandaging and self MLD techniques.  3. Patient and/or caregiver will be independent with HEP and lymphedema management to help prevent edema relapse and reduce risk of infection.  4. Patient will experience a decreased in girth of affected areas of 2.0 cm Partially met 9/10/2024  see girth chart     PLAN     Updated plan of care 7/18/24   Continue 2-3 times per month thru  10/9/24 to include the following interventions:Manual Lymphatic Drainage, short stretch compression bandaging.  Await continuation orders from Dr. Tao.    Ute Schwartz, TRISHA, SOFIA

## 2024-10-01 ENCOUNTER — CLINICAL SUPPORT (OUTPATIENT)
Dept: REHABILITATION | Facility: HOSPITAL | Age: 74
End: 2024-10-01
Payer: MEDICARE

## 2024-10-01 DIAGNOSIS — I89.0 LYMPHEDEMA OF RIGHT ARM: Primary | ICD-10-CM

## 2024-10-01 PROCEDURE — 97140 MANUAL THERAPY 1/> REGIONS: CPT

## 2024-10-01 NOTE — PROGRESS NOTES
[facility]  Occupational Therapy Treatment Note    Date: 10/1/2024  Name: Tiara Rasheed  Clinic Number: [id number]    Time In: 1:30  Time Out: 2:30  Total Billable Time: 60 minutes  .    Therapy Diagnosis:   Encounter Diagnosis   Name Primary?    Lymphedema of right arm Yes     Physician: Shiloh Tao MD  Physician Orders: OT Eval and Treat    Evaluation Date: 2/28/2023  Insurance Authorization Period Expiration: 12/31/2024  Plan of Care Expiration: NEW PLAN OF CARE 7/18/24-10/9/24 2-3 visits per month  Patient was not been able to return 7/18/24- 9/10/24 awaiting insurance authorization.  Visit # / Visits authorized 15/20  FOTO closed    Precautions:  Standard and cancer    SUBJECTIVE     Pt reports:Patient states the RUE home management has included use of sequential compression pump several days per week for an hour and occasional use of the daytime compression sleeve. She states she wears the bandages applied by therapist until the evening of therapy, but is unable to sleep in them.    Response to previous treatment: the RUE is soft throughout following treatment.   The pinkness in the RUE is not notable this visit.  The RUE  is not painful or warm. There is a measurable decrease in the edema of the RUE over the course of the year of treatment.  Functional change: n/a    Pain: 7/10  Location: the RIGHT & LEFT LOWER EXTREMITY .    Patient denies pain in the RUE with lymphedema. She reports ongoing back/hip/leg pain managed by pain management.    OBJECTIVE     Pt arrived with no compression on the arm. She brought her bandages for use.        Objective Measures updated at progress report unless specified.    Treatment     Tiara received the treatments listed below:     Manual therapy techniques were applied for 60 minutes, including:    MANUAL LYMPHATIC DRAINAGE (MLD):  Patient had less difficulty lifting the legs onto the mat and over the wedge this date.  While supine with  LEs elevated  over wedge, stimulation performed about the lymph nodes of the head and neck.  The entire R upper quadrant received drainage. The anterior chest wall was shunted across towards the L axilla. There is a small area filling in the lateral chest wall above the bra line of thick pitting edema.  This is uncomfortable with treatment.  The R  arm is full of soft pitting edema. The edema is  very soft but large in the upper arm. There is no notable pinkness about the upper arm, lateral arm/forearm  this date so that there is almost no color in the arm.The upper arm was treated with scooping and pumping and deep thumb [place] techniques. The cubital fossa was cleared. The forearm was treated with deep thumb [place] techniques to break down the pitting edema, as was the dorsal hand. There is moderately pitting edema throughout the lower arm, most notably along the lateral ridge. There is trace pitting over the dorsal hand this date. The digits were cleared then the entire arm was shunted up through the posterior upper arm. Patient then was assisted into sitting, deep thumb [place] techniques were performed about the thick pitting edema in the lateral chest. This softened well and quickly  and fluid was shunted across the back towards the L axilla.     MULTILAYERED Bandaging: Patient was bandaged in stockinette, 6, 8, 10 cm rosidal short stretch compression bandages from the dorsal hand to the axilla in the usual multilayered fashion.    Patient was encouraged to continue using her home sequential compression pump daily.  Patient was encouraged to use the daytime compression sleeve.     Pt was educated in potential compression needs.       Discussed wear schedule, don/doff, wash and management of products.    Patient Education and Home Exercises      Education provided:   1. Educated on cause of lymphedema.  2. Explained the Complete Decongestive Therapy protocol in depth  3. Educated on Phase 1 and 2 of  protocol.  4. Reviewed treatment frequency and likely duration of weeks  5. Precautions and contraindications for treatment.  6. Plan of care and goals.  7. Educated on home management protocols.     Written Home Compression program Provided: Patient to have the arm in short stretch compression bandages or compression sleeve daily.         Assessment       The pinkness and size of the edema are improved but remain and will benefit from ongoing manual lymphatic drainage to facilitate lymphatic flow.  There is a measurable decrease in the size of the arm over the year.     Tiara Rasheed is a 73 y.o. female referred to outpatient occupational therapy and presents with a medical diagnosis of lymphedema secondary to breast cancer. Lymphedema, left untreated increases risk of infection, and poor body image. Patient presents with the following therapy deficits: RUE lymphedema .Following medical record review it is determined that pt will benefit from complete decongestive therapy services for the treatment and management of this chronic condition. The following goals were discussed with the patient and patient is in agreement with them as to be addressed in the treatment plan.     The patient's rehab potential is Good.   Pt would continue to benefit from skilled OT. yes     Tiara is progressing well towards her goals and there are no updates to goals at this time. Pt prognosis is Good.     Pt will continue to benefit from skilled outpatient occupational therapy to address the deficits listed in the problem list on initial evaluation provide pt/family education and to maximize pt's level of independence in the home management of this chronic condition.     Plan of care is extended to continue treatment 2-3 times per month thru 10/9/24 ** Patient was not seen since 7/24  - 9/10 awaiting insurance authorization.    Pt's spiritual, cultural and educational needs considered and pt agreeable to plan of care and  goals.    Anticipated barriers to occupational therapy: duration of lymphedema and size of the arm    Goals:   Short Term Goals: (phase 1 of protocol)  1. Patient and/or caregiver will demonstrate understanding of lymphedema precautions to decrease the risk of infection and lymphedema exacerbation. - MET 6/1/2023   2. Patient will tolerate daily activities wearing multilayered bandaging.- Ongoing 7/18/2024  3. Patient and/or caregiver will order/obtain appropriate compression garments- Ongoing 7/18/242024  4. Patient will experience a decrease in girth of affected areas of 1.0 cm- Partially met 5/16/2024      Long Term Goals for 6 weeks: (Phase 2 of goals)  1. Patient and/or caregiver will be independent with donning and doffing of compression garments.  2. Patient and/or caregiver will be independent in self-bandaging and self MLD techniques.  3. Patient and/or caregiver will be independent with HEP and lymphedema management to help prevent edema relapse and reduce risk of infection.  4. Patient will experience a decreased in girth of affected areas of 2.0 cm Partially met 9/10/2024  see girth chart     PLAN     Updated plan of care 7/18/24   Continue 2-3 times per month thru  10/9/24 to include the following interventions:Manual Lymphatic Drainage, short stretch compression bandaging.  Await continuation orders from Dr. Tao.  Ute Schwartz, TRISHA, SOFIA

## 2024-10-22 ENCOUNTER — CLINICAL SUPPORT (OUTPATIENT)
Dept: REHABILITATION | Facility: HOSPITAL | Age: 74
End: 2024-10-22
Payer: MEDICARE

## 2024-10-22 DIAGNOSIS — I89.0 LYMPHEDEMA OF RIGHT ARM: Primary | ICD-10-CM

## 2024-10-22 PROCEDURE — 97140 MANUAL THERAPY 1/> REGIONS: CPT

## 2024-10-22 NOTE — PROGRESS NOTES
[facility]  Occupational Therapy Treatment Note    Date: 10/22/2024  Name: Tiara Rasheed  [facility] Number: [id number]    Time In: 1:30  Time Out: 2:30  Total Billable Time: 60 minutes  .    Therapy Diagnosis:   Encounter Diagnosis   Name Primary?    Lymphedema of right arm Yes     Physician: Shiloh Tao MD  Physician Orders: OT Eval and Treat    Evaluation Date: 2/28/2023  Insurance Authorization Period Expiration: 12/31/2024  Plan of Care Expiration: NEW PLAN OF CARE 10/22/24-12/31/24 2-3 visits per month    Visit # / Visits authorized 16/20  FOTO closed    Precautions:  Standard and cancer    SUBJECTIVE     Pt reports:Patient states the RUE home management has included use of sequential compression pump 4-5 days per week for an hour and occasional use of the daytime compression sleeve. She states she wears the bandages applied by therapist until the evening of therapy, but is unable to sleep in them.    Response to previous treatment: the RUE is soft throughout following treatment.   The pinkness in the RUE is not notable this visit.  The RUE  is not painful or warm. There is a measurable decrease in the edema of the RUE over the course of the year of treatment.  Functional change: n/a    Pain: 7/10  Location: the RIGHT & LEFT LOWER EXTREMITY .    Patient denies pain in the RUE with lymphedema. She reports ongoing back/hip/leg pain managed by pain management.    OBJECTIVE     Pt arrived with no compression on the arm. She brought her bandages for use.     UE Girth Measurements:  LANDMARK RIGHT UE  1/30/24 RIGHT UE  10/22/23 change             E + 15cm 35.5cm 34.0 cm -1.5 cm   E + 10cm 36.5 cm 36.0 cm -0.5cm   Elbow 34.0 cm 32.0 cm -2.0 cm             W + 15cm 34.0 cm 33.5 cm -0.5cm   W + 10cm 30.5 cm 30.0 cm -0.5cm   Wrist 19.5 cm 19.0cm -0.5 cm   DPC 19.0cm 19.0 cm 0.0cm   IP 6.0 cm 5.5 cm -0.5cm        Objective Measures updated at progress report unless specified.    Treatment      Tiara received the treatments listed below:     Manual therapy techniques were applied for 60 minutes, including:    MANUAL LYMPHATIC DRAINAGE (MLD):  Patient had less difficulty lifting the legs onto the mat and over the wedge this date.  While supine with LEs elevated  over wedge, stimulation performed about the lymph nodes of the head and neck.  The entire R upper quadrant received drainage. The anterior chest wall was shunted across towards the L axilla. There is a small area filling in the lateral chest wall above the bra line of thick pitting edema.  This is uncomfortable with treatment.  The R  arm is full of soft pitting edema. The edema is  very soft but large in the upper arm. There is no notable pinkness about the upper arm, lateral arm/forearm  this date so that there is almost no color in the arm.The upper arm was treated with scooping and pumping and deep thumb [place] techniques. The cubital fossa was cleared. The forearm was treated with deep thumb [place] techniques to break down the pitting edema, as was the dorsal hand. There is moderately pitting edema throughout the lower arm, most notably along the lateral ridge. There is trace pitting over the dorsal hand this date. The digits were cleared then the entire arm was shunted up through the posterior upper arm. Patient then was assisted into sitting, deep thumb [place] techniques were performed about the thick pitting edema in the lateral chest. This softened well and quickly  and fluid was shunted across the back towards the L axilla.     MULTILAYERED Bandaging: Patient was bandaged in stockinette, 6, 8, 10 cm rosidal short stretch compression bandages from the dorsal hand to the axilla in the usual multilayered fashion.    Patient was encouraged to continue using her home sequential compression pump daily.  Patient was encouraged to use the daytime compression sleeve.     Pt was educated in potential compression needs.       Discussed wear  schedule, don/doff, wash and management of products.    Patient Education and Home Exercises      Education provided:   1. Educated on cause of lymphedema.  2. Explained the Complete Decongestive Therapy protocol in depth  3. Educated on Phase 1 and 2 of protocol.  4. Reviewed treatment frequency and likely duration of weeks  5. Precautions and contraindications for treatment.  6. Plan of care and goals.  7. Educated on home management protocols.     Written Home Compression program Provided: Patient to have the arm in short stretch compression bandages or compression sleeve daily.         Assessment       The pinkness and size of the edema are improved but remain and will benefit from ongoing manual lymphatic drainage to facilitate lymphatic flow.  There is a measurable decrease in the size of the arm over the year.     Tiara Rasheed is a 74 y.o. female referred to outpatient occupational therapy and presents with a medical diagnosis of lymphedema secondary to breast cancer. Lymphedema, left untreated increases risk of infection, and poor body image. Patient presents with the following therapy deficits: RUE lymphedema .Following medical record review it is determined that pt will benefit from complete decongestive therapy services for the treatment and management of this chronic condition. The following goals were discussed with the patient and patient is in agreement with them as to be addressed in the treatment plan.     The patient's rehab potential is Good.   Pt would continue to benefit from skilled OT. yes     Tiara is progressing well towards her goals and there are no updates to goals at this time. Pt prognosis is Good.     Pt will continue to benefit from skilled outpatient occupational therapy to address the deficits listed in the problem list on initial evaluation provide pt/family education and to maximize pt's level of independence in the home management of this chronic condition.     Plan of  care is extended to continue treatment 2-3 times per 10/22/24-12/31/24    Pt's spiritual, cultural and educational needs considered and pt agreeable to plan of care and goals.    Anticipated barriers to occupational therapy: duration of lymphedema and size of the arm    Goals:   Short Term Goals: (phase 1 of protocol)  1. Patient and/or caregiver will demonstrate understanding of lymphedema precautions to decrease the risk of infection and lymphedema exacerbation. - MET 6/1/2023   2. Patient will tolerate daily activities wearing multilayered bandaging.- Ongoing 10/22/2024  3. Patient and/or caregiver will order/obtain appropriate compression garments- [id number]/24  4. Patient will experience a decrease in girth of affected areas of 1.0 cm- Partially met 5/16/2024      Long Term Goals for 6 weeks: (Phase 2 of goals)  1. Patient and/or caregiver will be independent with donning and doffing of compression garments.  2. Patient and/or caregiver will be independent in self-bandaging and self MLD techniques.  3. Patient and/or caregiver will be independent with HEP and lymphedema management to help prevent edema relapse and reduce risk of infection.  4. Patient will experience a decreased in girth of affected areas of 2.0 cm Partially met 10/22/2024  see girth chart     PLAN     Updated plan of care 10/22/24   Continue 2-3 times per month thru  12/31/24 to include the following interventions:Manual Lymphatic Drainage, short stretch compression bandaging.  Await continuation orders from Dr. Tao.  Ute Schwartz OT, SOFIA

## 2024-10-22 NOTE — PLAN OF CARE
[facility]  Occupational Therapy Treatment Note/ Progress Note     Date: 10/22/2024  Name: Tiara Rasheed  Clinic Number: [id number]     Time In: 1:30  Time Out: 2:30  Total Billable Time: 60 minutes  .     Therapy Diagnosis:        Encounter Diagnosis   Name Primary?    Lymphedema of right arm Yes      Physician: Shiloh Tao MD  Physician Orders: OT Eval and Treat     Evaluation Date: 2/28/2023  Insurance Authorization Period Expiration: 12/31/2024  Plan of Care Expiration: NEW PLAN OF CARE 10/22/24-12/31/24 2-3 visits per month     Visit # / Visits authorized 16/20  FOTO closed     Precautions:  Standard and cancer     SUBJECTIVE      Pt reports:Patient states the RUE home management has included use of sequential compression pump 4-5 days per week for an hour and occasional use of the daytime compression sleeve. She states she wears the bandages applied by therapist until the evening of therapy, but is unable to sleep in them.     Response to previous treatment: the RUE is soft throughout following treatment.   The pinkness in the RUE is not notable this visit.  The RUE  is not painful or warm. There is a measurable decrease in the edema of the RUE over the course of the year of treatment.  Functional change: n/a     Pain: 7/10  Location: the RIGHT & LEFT LOWER EXTREMITY .    Patient denies pain in the RUE with lymphedema. She reports ongoing back/hip/leg pain managed by pain management.     OBJECTIVE      Pt arrived with no compression on the arm. She brought her bandages for use.      UE Girth Measurements:  LANDMARK RIGHT UE  1/30/24 RIGHT UE  10/22/23 change             E + 15cm 35.5cm 34.0 cm -1.5 cm   E + 10cm 36.5 cm 36.0 cm -0.5cm   Elbow 34.0 cm 32.0 cm -2.0 cm             W + 15cm 34.0 cm 33.5 cm -0.5cm   W + 10cm 30.5 cm 30.0 cm -0.5cm   Wrist 19.5 cm 19.0cm -0.5 cm   DPC 19.0cm 19.0 cm 0.0cm   IP 6.0 cm 5.5 cm -0.5cm        Objective Measures updated at progress report  unless specified.     Treatment      Tiara received the treatments listed below:      Manual therapy techniques were applied for 60 minutes, including:     MANUAL LYMPHATIC DRAINAGE (MLD):  Patient had less difficulty lifting the legs onto the mat and over the wedge this date.  While supine with LEs elevated  over wedge, stimulation performed about the lymph nodes of the head and neck.  The entire R upper quadrant received drainage. The anterior chest wall was shunted across towards the L axilla. There is a small area filling in the lateral chest wall above the bra line of thick pitting edema.  This is uncomfortable with treatment.  The R  arm is full of soft pitting edema. The edema is  very soft but large in the upper arm. There is no notable pinkness about the upper arm, lateral arm/forearm  this date so that there is almost no color in the arm.The upper arm was treated with scooping and pumping and deep thumb [place] techniques. The cubital fossa was cleared. The forearm was treated with deep thumb [place] techniques to break down the pitting edema, as was the dorsal hand. There is moderately pitting edema throughout the lower arm, most notably along the lateral ridge. There is trace pitting over the dorsal hand this date. The digits were cleared then the entire arm was shunted up through the posterior upper arm. Patient then was assisted into sitting, deep thumb [place] techniques were performed about the thick pitting edema in the lateral chest. This softened well and quickly  and fluid was shunted across the back towards the L axilla.      MULTILAYERED Bandaging: Patient was bandaged in stockinette, 6, 8, 10 cm rosidal short stretch compression bandages from the dorsal hand to the axilla in the usual multilayered fashion.     Patient was encouraged to continue using her home sequential compression pump daily.  Patient was encouraged to use the daytime compression sleeve.      Pt was educated in potential  compression needs.        Discussed wear schedule, don/doff, wash and management of products.     Patient Education and Home Exercises       Education provided:   1. Educated on cause of lymphedema.  2. Explained the Complete Decongestive Therapy protocol in depth  3. Educated on Phase 1 and 2 of protocol.  4. Reviewed treatment frequency and likely duration of weeks  5. Precautions and contraindications for treatment.  6. Plan of care and goals.  7. Educated on home management protocols.      Written Home Compression program Provided: Patient to have the arm in short stretch compression bandages or compression sleeve daily.         Assessment        The pinkness and size of the edema are improved but remain and will benefit from ongoing manual lymphatic drainage to facilitate lymphatic flow.  There is a measurable decrease in the size of the arm over the year.      Tiara Rasheed is a 74 y.o. female referred to outpatient occupational therapy and presents with a medical diagnosis of lymphedema secondary to breast cancer. Lymphedema, left untreated increases risk of infection, and poor body image. Patient presents with the following therapy deficits: RUE lymphedema .Following medical record review it is determined that pt will benefit from complete decongestive therapy services for the treatment and management of this chronic condition. The following goals were discussed with the patient and patient is in agreement with them as to be addressed in the treatment plan.      The patient's rehab potential is Good.   Pt would continue to benefit from skilled OT. yes      Tiara is progressing well towards her goals and there are no updates to goals at this time. Pt prognosis is Good.      Pt will continue to benefit from skilled outpatient occupational therapy to address the deficits listed in the problem list on initial evaluation provide pt/family education and to maximize pt's level of independence in the home  management of this chronic condition.      Plan of care is extended to continue treatment 2-3 times per 10/22/24-12/31/24     Pt's spiritual, cultural and educational needs considered and pt agreeable to plan of care and goals.     Anticipated barriers to occupational therapy: duration of lymphedema and size of the arm     Goals:   Short Term Goals: (phase 1 of protocol)  1. Patient and/or caregiver will demonstrate understanding of lymphedema precautions to decrease the risk of infection and lymphedema exacerbation. - MET 6/1/2023   2. Patient will tolerate daily activities wearing multilayered bandaging.- Ongoing 10/22/2024  3. Patient and/or caregiver will order/obtain appropriate compression garments- Lievhd26/22/24  4. Patient will experience a decrease in girth of affected areas of 1.0 cm- Partially met 5/16/2024      Long Term Goals for 6 weeks: (Phase 2 of goals)  1. Patient and/or caregiver will be independent with donning and doffing of compression garments.  2. Patient and/or caregiver will be independent in self-bandaging and self MLD techniques.  3. Patient and/or caregiver will be independent with HEP and lymphedema management to help prevent edema relapse and reduce risk of infection.  4. Patient will experience a decreased in girth of affected areas of 2.0 cm Partially met 10/22/2024  see girth chart     PLAN      Updated plan of care 10/22/24   Continue 2-3 times per month thru  12/31/24 to include the following interventions:Manual Lymphatic Drainage, short stretch compression bandaging.  Await continuation orders from Dr. Tao.  Ute Schwartz OT, SOFIA

## 2024-12-09 ENCOUNTER — CLINICAL SUPPORT (OUTPATIENT)
Dept: REHABILITATION | Facility: HOSPITAL | Age: 74
End: 2024-12-09
Payer: MEDICARE

## 2024-12-09 DIAGNOSIS — I89.0 LYMPHEDEMA OF RIGHT ARM: Primary | ICD-10-CM

## 2024-12-09 PROCEDURE — 97140 MANUAL THERAPY 1/> REGIONS: CPT

## 2024-12-09 NOTE — PROGRESS NOTES
[facility] OUTPATIENT THERAPY AND WELLNESS  Occupational Therapy Treatment Note    Date: 12/9/2024  Name: Tiara Rasheed  [facility] Number: [id number]    Time In: 12:40  Time Out: 1:35  Total Billable Time: 55 minutes  .    Therapy Diagnosis:   Encounter Diagnosis   Name Primary?    Lymphedema of right arm Yes     Physician: Shiloh Tao MD  Physician Orders: OT Eval and Treat    Evaluation Date: 2/28/2023  Insurance Authorization Period Expiration: 12/31/2024  Plan of Care Expiration: NEW PLAN OF CARE 10/22/24-12/31/24 2-3 visits per month    Visit # / Visits authorized 17/20  FOTO closed    Precautions:  Standard and cancer    SUBJECTIVE     Pt reports:Patient states she just used her compression pump this AM.  She reports the RUE home management has included use of sequential compression pump 4-5 days per week for an hour and occasional use of the daytime compression sleeve. She states she wears the bandages applied by therapist until the evening of therapy, but is unable to sleep in them.    Response to previous treatment: the RUE is soft throughout following treatment.   The pinkness in the RUE is not notable this visit.  The RUE  is not painful or warm. There is a measurable decrease in the edema of the RUE over the course of the year of treatment.  Functional change: n/a    Pain: 7/10  Location: the RIGHT ankle and foot.  Patient denies pain in the RUE with lymphedema. She reports ongoing back/hip/leg pain managed by pain management.    OBJECTIVE     Pt arrived with no compression on the arm. She brought her bandages for use.     UE Girth Measurements:  LANDMARK RIGHT UE  1/30/24 RIGHT UE  12/9/24 change             E + 15cm 35.5cm 32.5 cm -3.0 cm   E + 10cm 36.5 cm 36.0 cm -0.5cm   Elbow 34.0 cm 31.5 cm -2.5 cm             W + 15cm 34.0 cm 32.0 cm -2.0cm   W + 10cm 30.5 cm 28.5 cm -2.0cm   Wrist 19.5 cm 18.0cm -1.5 cm   DPC 19.0cm 18.5 cm -1.5cm   IP 6.0 cm 5.5 cm -0.5cm        Objective Measures updated at  progress report unless specified.    Treatment     Tiara received the treatments listed below:     Manual therapy techniques were applied for 60 minutes, including:    MANUAL LYMPHATIC DRAINAGE (MLD):  Patient had less difficulty lifting the legs onto the mat and over the wedge this date. She complains of new pain in the R foot and ankle.  While supine with LEs elevated  over wedge, stimulation performed about the lymph nodes of the head and neck.  The entire R upper quadrant received drainage. The anterior chest wall was shunted across towards the L axilla. The R  arm is full of soft pitting edema. The edema is  very soft but large in the upper arm. There is no notable pinkness about the upper arm, lateral arm/forearm  this date so that there is almost no color in the arm.The upper arm was treated with scooping and pumping and deep thumb [place] techniques. The cubital fossa was cleared. The forearm was treated with deep thumb [place] techniques to break down the pitting edema, as was the dorsal hand. There is moderately pitting edema throughout the lower arm, most notably along the lateral ridge. There is trace pitting over the dorsal hand this date. The digits were cleared then the entire arm was shunted up through the posterior upper arm. Patient then was assisted into sitting, and fluid was shunted across the back towards the L axilla.     MULTILAYERED Bandaging: Patient was bandaged in stockinette, 6, 8, 10 cm rosidal short stretch compression bandages from the dorsal hand to the axilla in the usual multilayered fashion.    Patient was encouraged to continue using her home sequential compression pump daily.  Patient was encouraged to use the daytime compression sleeve.     Pt was educated in potential compression needs.       Discussed wear schedule, don/doff, wash and management of products.    Patient Education and Home Exercises      Education provided:   1. Educated on cause of lymphedema.  2. Explained  the Complete Decongestive Therapy protocol in depth  3. Educated on Phase 1 and 2 of protocol.  4. Reviewed treatment frequency and likely duration of weeks  5. Precautions and contraindications for treatment.  6. Plan of care and goals.  7. Educated on home management protocols.     Written Home Compression program Provided: Patient to have the arm in short stretch compression bandages or compression sleeve daily.         Assessment       The pinkness and size of the edema are improved but remain and will benefit from ongoing manual lymphatic drainage to facilitate lymphatic flow.  There is a measurable decrease in the size of the arm over the year.     Tiara Rasheed is a 74 y.o. female referred to outpatient occupational therapy and presents with a medical diagnosis of lymphedema secondary to breast cancer. Lymphedema, left untreated increases risk of infection, and poor body image. Patient presents with the following therapy deficits: RUE lymphedema .Following medical record review it is determined that pt will benefit from complete decongestive therapy services for the treatment and management of this chronic condition. The following goals were discussed with the patient and patient is in agreement with them as to be addressed in the treatment plan.     The patient's rehab potential is Good.   Pt would continue to benefit from skilled OT. yes     Tiara is progressing well towards her goals and there are no updates to goals at this time. Pt prognosis is Good.     Pt will continue to benefit from skilled outpatient occupational therapy to address the deficits listed in the problem list on initial evaluation provide pt/family education and to maximize pt's level of independence in the home management of this chronic condition.     Plan of care is extended to continue treatment 2-3 times per 10/22/24-12/31/24    Pt's spiritual, cultural and educational needs considered and pt agreeable to plan of care and  goals.    Anticipated barriers to occupational therapy: duration of lymphedema and size of the arm    Goals:   Short Term Goals: (phase 1 of protocol)  1. Patient and/or caregiver will demonstrate understanding of lymphedema precautions to decrease the risk of infection and lymphedema exacerbation. - MET 6/1/2023   2. Patient will tolerate daily activities wearing multilayered bandaging.- Ongoing 12/9/2024  3. Patient and/or caregiver will order/obtain appropriate compression garments- Nxrien46/9/24  4. Patient will experience a decrease in girth of affected areas of 1.0 cm- Partially met 12/9/2024      Long Term Goals for 6 weeks: (Phase 2 of goals)  1. Patient and/or caregiver will be independent with donning and doffing of compression garments.  2. Patient and/or caregiver will be independent in self-bandaging and self MLD techniques.  3. Patient and/or caregiver will be independent with HEP and lymphedema management to help prevent edema relapse and reduce risk of infection.  4. Patient will experience a decreased in girth of affected areas of 2.0 cm Partially met 12/9/2024  see girth chart     PLAN     Updated plan of care 10/22/24   Continue 2-3 times per month thru  12/31/24 to include the following interventions:Manual Lymphatic Drainage, short stretch compression bandaging.  Continuation orders received from Dr. Tao.  Ute Schwartz OT, SOFIA

## 2024-12-31 ENCOUNTER — CLINICAL SUPPORT (OUTPATIENT)
Dept: REHABILITATION | Facility: HOSPITAL | Age: 74
End: 2024-12-31
Payer: MEDICARE

## 2024-12-31 DIAGNOSIS — I89.0 LYMPHEDEMA OF RIGHT ARM: Primary | ICD-10-CM

## 2024-12-31 PROCEDURE — 97140 MANUAL THERAPY 1/> REGIONS: CPT

## 2024-12-31 NOTE — PLAN OF CARE
[facility]  Occupational Therapy Treatment Note/ PLAN OF CARE    Date: 12/31/2024  Name: Tiara Rasheed  Clinic Number: [id number]    Time In: 10:00  Time Out: 11:00  Total Billable Time: 60 minutes  .    Therapy Diagnosis:   Encounter Diagnosis   Name Primary?    Lymphedema of right arm Yes     Physician: Shiloh Tao MD  Physician Orders: OT Eval and Treat    Evaluation Date: 2/28/2023  Insurance Authorization Period Expiration: 12/31/2024  NEW PLAN OF CARE 12/31/24-3/30/25  1-2 visits per month    Visit # / Visits authorized 18/20  FOTO closed    Precautions:  Standard and cancer    SUBJECTIVE     Pt reports:Patient states she just uses her compression pump several times per week.  She reports the RUE home management has included use of sequential compression pump 4-5 days per week for an hour and occasional use of the daytime compression sleeve. She states she wears the bandages applied by therapist until the evening of therapy, but is unable to sleep in them.    Response to previous treatment: the RUE is soft throughout following treatment.   The pinkness in the RUE is not notable this visit.  The RUE  is not painful or warm. There is a measurable decrease in the edema of the RUE over the course of the year of treatment.  Functional change: n/a    Pain: 7/10  Location: running from the back down the back of BLE this date.  Patient denies pain in the RUE with lymphedema. She reports ongoing back/hip/leg pain managed by pain management.    OBJECTIVE     Pt arrived with no compression on the arm. She brought her bandages for use.     UE Girth Measurements:  LANDMARK RIGHT UE  1/30/24 RIGHT UE  12/9/24 change             E + 15cm 35.5cm 32.5 cm -3.0 cm   E + 10cm 36.5 cm 36.0 cm -0.5cm   Elbow 34.0 cm 31.5 cm -2.5 cm             W + 15cm 34.0 cm 32.0 cm -2.0cm   W + 10cm 30.5 cm 28.5 cm -2.0cm   Wrist 19.5 cm 18.0cm -1.5 cm   DPC 19.0cm 18.5 cm -1.5cm   IP 6.0 cm 5.5 cm -0.5cm         Objective Measures updated at progress report unless specified.    Treatment     Tiara received the treatments listed below:     Manual therapy techniques were applied for 60 minutes, including:    MANUAL LYMPHATIC DRAINAGE (MLD):  Patient had less difficulty lifting the legs onto the mat and over the wedge this date. She complains of new pain in the posterior BLE's.  While supine with LEs elevated  over wedge, stimulation performed about the lymph nodes of the head and neck.  The entire R upper quadrant received drainage. The anterior chest wall was shunted across towards the L axilla. The R  arm is full of soft pitting edema. The edema is  very soft but large in the upper arm. There is no notable pinkness about the upper arm, lateral arm/forearm  this date so that there is almost no color in the arm.The upper arm was treated with scooping and pumping and deep thumb [place] techniques. The cubital fossa was cleared. The forearm was treated with deep thumb [place] techniques to break down the pitting edema, as was the dorsal hand. There is moderately pitting edema throughout the lower arm, most notably along the lateral ridge. There is trace pitting over the dorsal hand this date. The digits were cleared then the entire arm was shunted up through the posterior upper arm. Patient then was assisted into sitting, and fluid was shunted across the back towards the L axilla.     MULTILAYERED Bandaging: Patient was bandaged in stockinette, 6, 8, 10 cm rosidal short stretch compression bandages from the dorsal hand to the axilla in the usual multilayered fashion.    Patient was encouraged to continue using her home sequential compression pump daily.  Patient was encouraged to use the daytime compression sleeve.     Pt was educated in potential compression needs.       Discussed wear schedule, don/doff, wash and management of products.    Patient Education and Home Exercises      Education provided:   1. Educated on  cause of lymphedema.  2. Explained the Complete Decongestive Therapy protocol in depth  3. Educated on Phase 1 and 2 of protocol.  4. Reviewed treatment frequency and likely duration of weeks  5. Precautions and contraindications for treatment.  6. Plan of care and goals.  7. Educated on home management protocols.     Written Home Compression program Provided: Patient to have the arm in short stretch compression bandages or compression sleeve daily.         Assessment       The pinkness and size of the edema are improved but remain and will benefit from ongoing manual lymphatic drainage to facilitate lymphatic flow.  There is a measurable decrease in the size of the arm over the year.     Tiara Rasheed is a 74 y.o. female referred to outpatient occupational therapy and presents with a medical diagnosis of lymphedema secondary to breast cancer. Lymphedema, left untreated increases risk of infection, and poor body image. Patient presents with the following therapy deficits: RUE lymphedema .Following medical record review it is determined that pt will benefit from complete decongestive therapy services for the treatment and management of this chronic condition. The following goals were discussed with the patient and patient is in agreement with them as to be addressed in the treatment plan.     The patient's rehab potential is Good.   Pt would continue to benefit from skilled OT. yes     Tiara is progressing well towards her goals and there are no updates to goals at this time. Pt prognosis is Good.     Pt will continue to benefit from skilled outpatient occupational therapy to address the deficits listed in the problem list on initial evaluation provide pt/family education and to maximize pt's level of independence in the home management of this chronic condition.     Plan of care is extended to continue treatment 1-2 times per month 2/31/24-3/30/25    Pt's spiritual, cultural and educational needs considered  and pt agreeable to plan of care and goals.    Anticipated barriers to occupational therapy: duration of lymphedema and size of the arm    Goals:   Short Term Goals: (phase 1 of protocol)  1. Patient and/or caregiver will demonstrate understanding of lymphedema precautions to decrease the risk of infection and lymphedema exacerbation. - MET 6/1/2023   2. Patient will tolerate daily activities wearing multilayered bandaging.- Ongoing 12/31/2024  3. Patient and/or caregiver will order/obtain appropriate compression garments- Ongoing 12/31/24  4. Patient will experience a decrease in girth of affected areas of 1.0 cm- Partially met 12/31/2024      Long Term Goals for 6 weeks: (Phase 2 of goals)  1. Patient and/or caregiver will be independent with donning and doffing of compression garments.  2. Patient and/or caregiver will be independent in self-bandaging and self MLD techniques.  3. Patient and/or caregiver will be independent with HEP and lymphedema management to help prevent edema relapse and reduce risk of infection.  4. Patient will experience a decreased in girth of affected areas of 2.0 cm Partially met 12/31/2024  see girth chart     PLAN     Updated plan of care 12/31/24   Continue 1-2 times per month thru  3/30/25 to include the following interventions:Manual Lymphatic Drainage, short stretch compression bandaging.  Continuation orders received from Dr. Tao.  Ute Schwartz OT, SOFIA   wine

## 2025-01-16 ENCOUNTER — CLINICAL SUPPORT (OUTPATIENT)
Dept: REHABILITATION | Facility: HOSPITAL | Age: 75
End: 2025-01-16
Payer: MEDICARE

## 2025-01-16 DIAGNOSIS — I89.0 LYMPHEDEMA OF RIGHT ARM: Primary | ICD-10-CM

## 2025-01-16 PROCEDURE — 97140 MANUAL THERAPY 1/> REGIONS: CPT

## 2025-01-16 NOTE — PLAN OF CARE
[facility]  Occupational Therapy Treatment Note  PLAN OF CARE     Date: 1/16/2025  Name: Tiara Rasheed  Clinic Number: [id number]     Time In: 11:10  Time Out: 12:10  Total Billable Time: 60 minutes  .     Therapy Diagnosis:        Encounter Diagnosis   Name Primary?    Lymphedema of right arm Yes      Physician: Shiloh Tao MD  Physician Orders: OT Eval and Treat     Initial Evaluation Date: 2/28/2023  Insurance Authorization Period Expiration: 11/20/2025    PLAN OF CARE 12/31/24-3/30/25  1-2 visits per month     Visit # / Visits authorized 1/1  FOTO closed     Precautions:  Standard and cancer     SUBJECTIVE      Pt reports:Patient states she uses her compression pump several times per week.  She reports the RUE home management has included use of sequential compression pump 4-5 days per week for an hour and occasional use of the daytime compression sleeve. She states she wears the bandages applied by therapist until the evening of therapy, but is unable to sleep in them.     Response to previous treatment: the RUE is soft throughout following treatment.   The pinkness in the RUE is no longer notable. The RUE  is not painful or warm. There is a measurable decrease in the edema of the RUE over the course of the year of treatment.  Functional change: n/a     Pain: 7/10  Location: running from the back down the back of BLE this date.  Patient denies pain in the RUE with lymphedema. She reports ongoing back/hip/leg pain managed by pain management.     OBJECTIVE      Pt arrived with no compression on the arm. She brought her bandages for use.      UE Girth Measurements:  LANDMARK RIGHT UE  1/30/24 RIGHT UE  1/16/25 change             E + 15cm 35.5cm 32.5 cm -3.0 cm   E + 10cm 36.5 cm 36.0 cm -0.5cm   Elbow 34.0 cm 33.0 cm -1.0 cm             W + 15cm 34.0 cm 32.0 cm -2.0cm   W + 10cm 30.5 cm 28.0cm -1.5cm   Wrist 19.5 cm 18.5cm -1.0 cm   DPC 19.0cm 17.5 cm -1.5cm   IP 6.0 cm 5.5 cm  -0.5cm        Objective Measures updated at progress report unless specified.     Treatment      Tiara received the treatments listed below:      Manual therapy techniques were applied for 60 minutes, including:     MANUAL LYMPHATIC DRAINAGE (MLD):  Patient had less difficulty lifting the legs onto the mat and over the wedge this date. She complains of pain in the posterior BLE's.  While supine with LEs elevated  over wedge, stimulation performed about the lymph nodes of the head and neck.  The entire R upper quadrant received drainage. The anterior chest wall was shunted across towards the L axilla. The R  arm is full of soft pitting edema. The edema is  very soft but large in the upper arm. There is no notable pinkness about the upper arm, lateral arm/forearm  this date so that there is almost no color in the arm.The upper arm was treated with scooping and pumping and deep thumb [place] techniques. The cubital fossa was cleared. The forearm was treated with deep thumb [place] techniques to break down the pitting edema, as was the dorsal hand. There is moderately pitting edema throughout the lower arm, most notably along the lateral ridge. There is trace pitting over the dorsal hand this date. The digits were cleared then the entire arm was shunted up through the posterior upper arm. Patient then was assisted into sitting, and fluid was shunted across the back towards the L axilla.      MULTILAYERED Bandaging: Patient was bandaged in stockinette, 6, 8, 10 cm rosidal short stretch compression bandages from the dorsal hand to the axilla in the usual multilayered fashion.     Patient was encouraged to continue using her home sequential compression pump daily.  Patient was encouraged to use the daytime compression sleeve.      Pt was educated in potential compression needs.        Discussed wear schedule, don/doff, wash and management of products.     Patient Education and Home Exercises       Education provided:    1. Educated on cause of lymphedema.  2. Explained the Complete Decongestive Therapy protocol in depth  3. Educated on Phase 1 and 2 of protocol.  4. Reviewed treatment frequency and likely duration of weeks  5. Precautions and contraindications for treatment.  6. Plan of care and goals.  7. Educated on home management protocols.      Written Home Compression program Provided: Patient encouraged to have the arm in short stretch compression bandages or compression sleeve daily.         Assessment        The pinkness in the arm is improved.  The size of the edema is slightly improved but remains and will benefit from ongoing manual lymphatic drainage to facilitate lymphatic flow.  There is a measurable decrease in the size of the arm over the year.      Tiara Rasheed is a 74 y.o. female referred to outpatient occupational therapy and presents with a medical diagnosis of lymphedema secondary to breast cancer. Lymphedema, left untreated increases risk of infection, and poor body image. Patient presents with the following therapy deficits: RUE lymphedema .Following medical record review it is determined that pt will benefit from complete decongestive therapy services for the treatment and management of this chronic condition. The following goals were discussed with the patient and patient is in agreement with them as to be addressed in the treatment plan.      The patient's rehab potential is Good.   Pt would continue to benefit from skilled OT. yes      Tiara is progressing well towards her goals and there are no updates to goals at this time. Pt prognosis is Good.      Pt will continue to benefit from skilled outpatient occupational therapy to address the deficits listed in the problem list on initial evaluation provide pt/family education and to maximize pt's level of independence in the home management of this chronic condition.      Plan of care is extended to continue treatment 1-2 times per month  2/31/24-3/30/25     Pt's spiritual, cultural and educational needs considered and pt agreeable to plan of care and goals.     Anticipated barriers to occupational therapy: duration of lymphedema and size of the arm     Goals:   Short Term Goals: (phase 1 of protocol)  1. Patient and/or caregiver will demonstrate understanding of lymphedema precautions to decrease the risk of infection and lymphedema exacerbation. - MET 6/1/2023   2. Patient will tolerate daily activities wearing multilayered bandaging.- Ongoing 1/16/2025  3. Patient and/or caregiver will order/obtain appropriate compression garments- MET 1/16/2025  4. Patient will experience a decrease in girth of affected areas of 1.0 cm- Partially met 1/16/2025     Long Term Goals for 6 weeks: (Phase 2 of goals)  1. Patient and/or caregiver will be independent with donning and doffing of compression garments. MET 1/16/2025  2. Patient and/or caregiver will be independent in self-bandaging and self MLD techniques. Ongoing 1/16/2025  3. Patient and/or caregiver will be independent with HEP and lymphedema management to help prevent edema relapse and reduce risk of infection.  4. Patient will experience a decreased in girth of affected areas of 2.0 cm Partially met 1/16/2025  see girth chart     PLAN      Updated plan of care 1/16/2025   Continue 1-2 times per month thru  3/30/25 to include the following interventions:Manual Lymphatic Drainage, short stretch compression bandaging.  Continuation orders received from Dr. Tao.  Ute Schwartz, OT, SOFIA

## 2025-02-04 ENCOUNTER — CLINICAL SUPPORT (OUTPATIENT)
Dept: REHABILITATION | Facility: HOSPITAL | Age: 75
End: 2025-02-04
Payer: MEDICARE

## 2025-02-04 DIAGNOSIS — I89.0 LYMPHEDEMA OF RIGHT ARM: Primary | ICD-10-CM

## 2025-02-04 PROCEDURE — 97140 MANUAL THERAPY 1/> REGIONS: CPT

## 2025-02-04 NOTE — PROGRESS NOTES
[facility] OUTPATIENT THERAPY AND WELLNESS  Occupational Therapy Treatment Note  PLAN OF CARE     Date: 2/4/2025  Name: Tiara Rasheed  Clinic Number: [id number]     Time In: 11:30  Time Out: 12:30  Total Billable Time: 60 minutes  .     Therapy Diagnosis:           Encounter Diagnosis   Name Primary?    Lymphedema of right arm Yes      Physician: Shiloh Tao MD  Physician Orders: OT Eval and Treat     Initial Evaluation Date: 2/28/2023  Insurance Authorization Period Expiration: 2/6/2025-12/31/2025     PLAN OF CARE 12/31/24-3/30/25  1-2 visits per month     Visit # / Visits authorized 1/1  FOTO closed     Precautions:  Standard and cancer     SUBJECTIVE      Pt reports:Patient states she uses her compression pump several times per week.  She reports the RUE home management has included use of sequential compression pump 4-5 days per week for an hour and occasional use of the daytime compression sleeve. She states she wears the bandages applied by therapist until the evening of therapy, but is unable to sleep in them.     Response to previous treatment: the RUE is soft throughout following treatment.   The pinkness in the RUE is no longer notable. The RUE  is not painful or warm. There is a measurable decrease in the edema of the RUE over the course of the year of treatment.  Functional change: n/a     Pain: 7/10  Location: running from the back down the back of BLE this date.  Patient denies pain in the RUE with lymphedema. She reports ongoing back/hip/leg pain managed by pain management.     OBJECTIVE      Pt arrived with no compression on the arm. She brought her bandages for use.       Objective Measures updated at progress report unless specified.     Treatment      Tiara received the treatments listed below:      Manual therapy techniques were applied for 60 minutes, including:     MANUAL LYMPHATIC DRAINAGE (MLD):  Patient had less difficulty lifting the legs onto the mat and over the wedge this date.  She complains of pain in the posterior BLE's.  While supine with LEs elevated  over wedge, stimulation performed about the lymph nodes of the head and neck.  The entire R upper quadrant received drainage. The anterior chest wall was shunted across towards the L axilla. The R  arm is full of soft pitting edema. The edema is  very soft but large in the upper arm. There is no notable pinkness about the upper arm, lateral arm/forearm  this date so that there is almost no color in the arm.The upper arm was treated with scooping and pumping and deep thumb [place] techniques. The cubital fossa was cleared. The forearm was treated with deep thumb [place] techniques to break down the pitting edema, as was the dorsal hand. There is a slight increase in the density of the moderately pitting edema throughout the lower arm, most notably along the lateral ridge. There is trace pitting over the dorsal hand this date. The digits were cleared then the entire arm was shunted up through the posterior upper arm. Patient then was assisted into sitting, and fluid was shunted across the back towards the L axilla.      MULTILAYERED Bandaging: Patient was bandaged in stockinette, 6, 8, 10 cm rosidal short stretch compression bandages from the dorsal hand to the axilla in the usual multilayered fashion.     Patient was encouraged to continue using her home sequential compression pump daily.  Patient was encouraged to use the daytime compression sleeve.      Pt was educated in potential compression needs.        Discussed wear schedule, don/doff, wash and management of products.     Patient Education and Home Exercises       Education provided:   1. Educated on cause of lymphedema.  2. Explained the Complete Decongestive Therapy protocol in depth  3. Educated on Phase 1 and 2 of protocol.  4. Reviewed treatment frequency and likely duration of weeks  5. Precautions and contraindications for treatment.  6. Plan of care and goals.  7. Educated on  home management protocols.      Written Home Compression program Provided: Patient encouraged to have the arm in short stretch compression bandages or compression sleeve daily.         Assessment        The pinkness in the arm is improved.  The size of the edema is slightly improved but remains and will benefit from ongoing manual lymphatic drainage to facilitate lymphatic flow.  There is a measurable decrease in the size of the arm over the year.      Tiara Rasheed is a 74 y.o. female referred to outpatient occupational therapy and presents with a medical diagnosis of lymphedema secondary to breast cancer. Lymphedema, left untreated increases risk of infection, and poor body image. Patient presents with the following therapy deficits: RUE lymphedema .Following medical record review it is determined that pt will benefit from complete decongestive therapy services for the treatment and management of this chronic condition. The following goals were discussed with the patient and patient is in agreement with them as to be addressed in the treatment plan.      The patient's rehab potential is Good.   Pt would continue to benefit from skilled OT. yes      Tiara is progressing well towards her goals and there are no updates to goals at this time. Pt prognosis is Good.      Pt will continue to benefit from skilled outpatient occupational therapy to address the deficits listed in the problem list on initial evaluation provide pt/family education and to maximize pt's level of independence in the home management of this chronic condition.      Plan of care is extended to continue treatment 1-2 times per month 2/31/24-3/30/25     Pt's spiritual, cultural and educational needs considered and pt agreeable to plan of care and goals.     Anticipated barriers to occupational therapy: duration of lymphedema and size of the arm     Goals:   Short Term Goals: (phase 1 of protocol)  1. Patient and/or caregiver will demonstrate  understanding of lymphedema precautions to decrease the risk of infection and lymphedema exacerbation. - MET 6/1/2023   2. Patient will tolerate daily activities wearing multilayered bandaging.- Ongoing 2/4/2025  3. Patient and/or caregiver will order/obtain appropriate compression garments- MET 1/16/2025  4. Patient will experience a decrease in girth of affected areas of 1.0 cm- Partially met 2/4/2025     Long Term Goals for 6 weeks: (Phase 2 of goals)  1. Patient and/or caregiver will be independent with donning and doffing of compression garments. MET 1/16/2025  2. Patient and/or caregiver will be independent in self-bandaging and self MLD techniques. Ongoing 2/4/2025  3. Patient and/or caregiver will be independent with HEP and lymphedema management to help prevent edema relapse and reduce risk of infection.  4. Patient will experience a decreased in girth of affected areas of 2.0 cm Partially met 1/16/2025  see girth chart ongoing 2/4/25     PLAN      Updated plan of care 1/16/2025   Continue 1-2 times per month thru  3/30/25 to include the following interventions:Manual Lymphatic Drainage, short stretch compression bandaging.    Ute Schwartz OT, SOFIA

## 2025-02-27 ENCOUNTER — CLINICAL SUPPORT (OUTPATIENT)
Dept: REHABILITATION | Facility: HOSPITAL | Age: 75
End: 2025-02-27
Payer: MEDICARE

## 2025-02-27 DIAGNOSIS — I89.0 LYMPHEDEMA OF RIGHT ARM: Primary | ICD-10-CM

## 2025-02-27 PROCEDURE — 97140 MANUAL THERAPY 1/> REGIONS: CPT

## 2025-02-27 NOTE — PROGRESS NOTES
[facility]  Occupational Therapy Treatment Note  PLAN OF CARE     Date: 2/27/2025  Name: Tiara Rasheed  Clinic Number: [id number]     Time In: 11:00  Time Out: 12:00  Total Billable Time: 60 minutes  .     Therapy Diagnosis:           Encounter Diagnosis   Name Primary?    Lymphedema of right arm Yes      Physician: Shiloh Tao MD  Physician Orders: OT Eval and Treat     Initial Evaluation Date: 2/28/2023  Insurance Authorization Period Expiration: 2/6/2025-12/31/2025     PLAN OF CARE 12/31/24-3/30/25  1-2 visits per month     Visit # / Visits authorized 2/12  FOTO closed     Precautions:  Standard and cancer     SUBJECTIVE      Pt reports:Patient states she uses her compression pump several times per week.  She reports the RUE home management has included use of sequential compression pump 4-5 days per week for an hour and occasional use of the daytime compression sleeve. She states she wears the bandages applied by therapist until the evening of therapy, but is unable to sleep in them.   Patient forgot to bring her bandages with her this date.    Response to previous treatment: the RUE is soft throughout following treatment.   The pinkness in the RUE is no longer notable. The RUE  is not painful or warm. There is a measurable decrease in the edema of the RUE over the course of the year of treatment.  Functional change: n/a     Pain: 6/10  Location: running from the back down the back of BLE this date.  Patient denies pain in the RUE with lymphedema. She reports ongoing back/hip/leg pain managed by pain management.     OBJECTIVE      Pt arrived with no compression on the arm. She forgot her bandages for use following treatment.       Objective Measures updated at progress report unless specified.     Treatment      Tiara received the treatments listed below:      Manual therapy techniques were applied for 60 minutes, including:     MANUAL LYMPHATIC DRAINAGE (MLD):  Patient had  mild difficulty lifting the legs onto the mat and over the wedge this date. She complains of pain in the posterior BLE's.  While supine with LEs elevated  over wedge, stimulation performed about the lymph nodes of the head and neck.  The entire R upper quadrant received drainage. The anterior chest wall was shunted across towards the L axilla. Patient reports weightloss and RUE is softer and smaller, as are the supraclavicular regions and lateral neck. The R arm is full of soft pitting edema. The edema is very soft but large in the upper arm. There is no notable pinkness about the upper arm, lateral arm/forearm  this date.  The upper arm was treated with scooping and pumping and deep thumb [place] techniques. The cubital fossa was cleared. The forearm was treated with deep thumb [place] techniques to break down the pitting edema, as was the dorsal hand. There is a slight increase in the density of the moderately pitting edema throughout the lower arm, most notably along the lateral ridge. There is trace pitting over the dorsal hand this date. The digits were cleared then the entire arm was shunted up through the posterior upper arm. Patient then was assisted into sitting, and fluid was shunted across the back towards the L axilla.      MULTILAYERED Bandaging: New supplies were issued and Patient was bandaged in stockinette, 6, 8, 10 cm rosidal short stretch compression bandages from the dorsal hand to the axilla in the usual multilayered fashion.     Patient was encouraged to continue using her home sequential compression pump daily.  Patient was encouraged to use the daytime compression sleeve.      Pt was educated in potential compression needs.        Discussed wear schedule, don/doff, wash and management of products.     Patient Education and Home Exercises       Education provided:   1. Educated on cause of lymphedema.  2. Explained the Complete Decongestive Therapy protocol in depth  3. Educated on Phase 1 and 2  of protocol.  4. Reviewed treatment frequency and likely duration of weeks  5. Precautions and contraindications for treatment.  6. Plan of care and goals.  7. Educated on home management protocols.      Written Home Compression program Provided: Patient encouraged to have the arm in short stretch compression bandages or compression sleeve daily.         Assessment        The pinkness in the arm is improved.  The size of the edema is slightly improved but remains and will benefit from ongoing manual lymphatic drainage to facilitate lymphatic flow.  There is a measurable decrease in the size of the arm over the year.      Tiara Rasheed is a 74 y.o. female referred to outpatient occupational therapy and presents with a medical diagnosis of lymphedema secondary to breast cancer. Lymphedema, left untreated increases risk of infection, and poor body image. Patient presents with the following therapy deficits: RUE lymphedema .Following medical record review it is determined that pt will benefit from complete decongestive therapy services for the treatment and management of this chronic condition. The following goals were discussed with the patient and patient is in agreement with them as to be addressed in the treatment plan.      The patient's rehab potential is Good.   Pt would continue to benefit from skilled OT. yes      Tiara is progressing well towards her goals and there are no updates to goals at this time. Pt prognosis is Good.      Pt will continue to benefit from skilled outpatient occupational therapy to address the deficits listed in the problem list on initial evaluation provide pt/family education and to maximize pt's level of independence in the home management of this chronic condition.      Plan of care is extended to continue treatment 1-2 times per month 2/31/24-3/30/25     Pt's spiritual, cultural and educational needs considered and pt agreeable to plan of care and goals.     Anticipated  barriers to occupational therapy: duration of lymphedema and size of the arm     Goals:   Short Term Goals: (phase 1 of protocol)  1. Patient and/or caregiver will demonstrate understanding of lymphedema precautions to decrease the risk of infection and lymphedema exacerbation. - MET 6/1/2023   2. Patient will tolerate daily activities wearing multilayered bandaging.- Ongoing 2/27/2025  3. Patient and/or caregiver will order/obtain appropriate compression garments- MET 1/16/2025  4. Patient will experience a decrease in girth of affected areas of 1.0 cm- Partially met 2/27/2025     Long Term Goals for 6 weeks: (Phase 2 of goals)  1. Patient and/or caregiver will be independent with donning and doffing of compression garments. MET 1/16/2025  2. Patient and/or caregiver will be independent in self-bandaging and self MLD techniques. Ongoing 2/27/2025  3. Patient and/or caregiver will be independent with HEP and lymphedema management to help prevent edema relapse and reduce risk of infection.  4. Patient will experience a decreased in girth of affected areas of 2.0 cm Partially met 1/16/2025    PLAN      Updated plan of care 1/16/2025   Continue 1-2 times per month thru  3/30/25 to include the following interventions:Manual Lymphatic Drainage, short stretch compression bandaging.    Ute Schwartz OT, MEETAR

## 2025-03-11 ENCOUNTER — CLINICAL SUPPORT (OUTPATIENT)
Dept: REHABILITATION | Facility: HOSPITAL | Age: 75
End: 2025-03-11
Payer: MEDICARE

## 2025-03-11 DIAGNOSIS — I89.0 LYMPHEDEMA OF RIGHT ARM: Primary | ICD-10-CM

## 2025-03-11 PROCEDURE — 97140 MANUAL THERAPY 1/> REGIONS: CPT

## 2025-03-11 NOTE — PROGRESS NOTES
[facility]  Occupational Therapy Treatment Note  Discharge Note     Date: 3/10/2025  Name: Tiara Rasheed  Clinic Number: [id number]     Time In: 2:00  Time Out: 3:00  Total Billable Time: 60 minutes  .     Therapy Diagnosis:           Encounter Diagnosis   Name Primary?    Lymphedema of right arm Yes      Physician: Shiloh Tao MD  Physician Orders: OT Eval and Treat     Initial Evaluation Date: 2/28/2023  Insurance Authorization Period Expiration: 2/6/2025-12/31/2025     PLAN OF CARE 12/31/24-3/30/25  1-2 visits per month     Visit # / Visits authorized 3/12  FOTO closed     Precautions:  Standard and cancer     SUBJECTIVE      Pt reports:Patient states she uses her compression pump several times per week.  She reports the RUE home management has included use of sequential compression pump 4-5 days per week for an hour and occasional use of the daytime compression sleeve. She states she wears the bandages applied by therapist until the evening of therapy, but is unable to sleep in them.   Patient forgot to bring her bandages with her this date.    Response to previous treatment: the RUE is soft throughout following treatment.   The pinkness in the RUE is no longer notable. The RUE  is not painful or warm. There is a measurable decrease in the edema of the RUE over the course of the year of treatment.  Functional change: n/a     Pain: 5/10  Location: running from the back down the back of BLE this date.  Patient denies pain in the RUE with lymphedema. She reports ongoing back/hip/leg pain managed by pain management.     OBJECTIVE      Pt arrived with no compression on the arm. She forgot her bandages for use following treatment.       Objective Measures updated at progress report unless specified.     Treatment      Tiara received the treatments listed below:      Manual therapy techniques were applied for 60 minutes, including:     MANUAL LYMPHATIC DRAINAGE (MLD):  Patient had  mild difficulty lifting the legs onto the mat and over the wedge this date. She complains of pain in the posterior BLE's.  While supine with LEs elevated  over wedge, stimulation performed about the lymph nodes of the head and neck.  The entire R upper quadrant received drainage. The anterior chest wall was shunted across towards the L axilla. Patient reports weightloss and RUE is softer and smaller, as are the supraclavicular regions and lateral neck. The R arm is full of soft pitting edema. The edema is very soft but large in the upper arm. There is very light notable pinkness about the upper arm, lateral arm/forearm  this date.  The upper arm was treated with scooping and pumping and deep thumb [place] techniques. The cubital fossa was cleared. The forearm was treated with deep thumb [place] techniques to break down the pitting edema, as was the dorsal hand. There is a slight improvement in the density of the moderately pitting edema throughout the lower arm, most notably along the lateral ridge. There is trace pitting over the dorsal hand this date. The digits were cleared then the entire arm was shunted up through the posterior upper arm. Patient then was assisted into sitting, and fluid was shunted across the back towards the L axilla.      MULTILAYERED Bandaging: Patient was bandaged in stockinette, 6, 8, 10 cm rosidal short stretch compression bandages from the dorsal hand to the axilla in the usual multilayered fashion.     Patient was encouraged to continue using her home sequential compression pump daily.  Patient was encouraged to use the daytime compression sleeve.      Pt was educated in potential compression needs.        Discussed wear schedule, don/doff, wash and management of products.     Patient Education and Home Exercises       Education provided:   1. Educated on cause of lymphedema.  2. Explained the Complete Decongestive Therapy protocol in depth  3. Educated on Phase 1 and 2 of protocol.  4.  Reviewed treatment frequency and likely duration of weeks  5. Precautions and contraindications for treatment.  6. Plan of care and goals.  7. Educated on home management protocols.      Written Home Compression program Provided: Patient encouraged to have the arm in short stretch compression bandages or compression sleeve daily.         Assessment        The pinkness in the arm is improved.  The size of the edema is slightly improved but remains and will benefit from ongoing manual lymphatic drainage to facilitate lymphatic flow.  There is a measurable decrease in the size of the arm over the year.      Tiara Rasheed is a 74 y.o. female referred to outpatient occupational therapy and presents with a medical diagnosis of lymphedema secondary to breast cancer. Lymphedema, left untreated increases risk of infection, and poor body image. Patient presents with the following therapy deficits: RUE lymphedema .Following medical record review it is determined that pt will benefit from complete decongestive therapy services for the treatment and management of this chronic condition. The following goals were discussed with the patient and patient is in agreement with them as to be addressed in the treatment plan.      The patient's rehab potential is Good.   Pt would continue to benefit from skilled OT. yes      Tiara is progressing well towards her goals and there are no updates to goals at this time. Pt prognosis is Good.      Pt will continue to benefit from skilled outpatient occupational therapy to address the deficits listed in the problem list on initial evaluation provide pt/family education and to maximize pt's level of independence in the home management of this chronic condition.      Plan of care is extended to continue treatment 1-2 times per month 2/31/24-3/30/25     Pt's spiritual, cultural and educational needs considered and pt agreeable to plan of care and goals.     Anticipated barriers to  occupational therapy: duration of lymphedema and size of the arm     Goals:   Short Term Goals: (phase 1 of protocol)  1. Patient and/or caregiver will demonstrate understanding of lymphedema precautions to decrease the risk of infection and lymphedema exacerbation. - MET 6/1/2023   2. Patient will tolerate daily activities wearing multilayered bandaging.- Ongoing 3/10/2025  3. Patient and/or caregiver will order/obtain appropriate compression garments- MET 1/16/2025  4. Patient will experience a decrease in girth of affected areas of 1.0 cm- Partially met 2/27/2025     Long Term Goals for 6 weeks: (Phase 2 of goals)  1. Patient and/or caregiver will be independent with donning and doffing of compression garments. MET 1/16/2025  2. Patient and/or caregiver will be independent in self-bandaging and self MLD techniques. Ongoing 3/10/2025  3. Patient and/or caregiver will be independent with HEP and lymphedema management to help prevent edema relapse and reduce risk of infection.  4. Patient will experience a decreased in girth of affected areas of 2.0 cm Partially met 1/16/2025    PLAN      Updated plan of care 1/16/2025   Therapist is leaving this position. Patient chooses to continue her care with this therapist at her new clinic.  Patient is discharged from [facility] outpatient therapy at this time.    Ute Schwartz OT, SOFIA